# Patient Record
Sex: FEMALE | Race: WHITE | Employment: UNEMPLOYED | URBAN - METROPOLITAN AREA
[De-identification: names, ages, dates, MRNs, and addresses within clinical notes are randomized per-mention and may not be internally consistent; named-entity substitution may affect disease eponyms.]

---

## 2017-09-01 ENCOUNTER — HOSPITAL ENCOUNTER (EMERGENCY)
Age: 22
Discharge: HOME | End: 2017-09-01
Attending: EMERGENCY MEDICINE | Admitting: EMERGENCY MEDICINE
Payer: COMMERCIAL

## 2017-09-01 VITALS
RESPIRATION RATE: 20 BRPM | DIASTOLIC BLOOD PRESSURE: 91 MMHG | WEIGHT: 293 LBS | SYSTOLIC BLOOD PRESSURE: 140 MMHG | HEIGHT: 64 IN | TEMPERATURE: 98.3 F | BODY MASS INDEX: 50.02 KG/M2 | OXYGEN SATURATION: 100 % | HEART RATE: 100 BPM

## 2017-09-01 DIAGNOSIS — R03.0 ELEVATED BLOOD PRESSURE READING: ICD-10-CM

## 2017-09-01 DIAGNOSIS — H57.9 EYE PROBLEM: Primary | ICD-10-CM

## 2017-09-01 PROCEDURE — 99283 EMERGENCY DEPT VISIT LOW MDM: CPT

## 2017-09-01 NOTE — ED NOTES
Pt discharged to home ambulatory and in company of mother  Discharge instructions provided via discussion and handout. Teaching to patient. Verbalized understanding. No questions voiced. Discharged with 0 RX.

## 2017-09-01 NOTE — ED PROVIDER NOTES
Patient is a 23 y.o. female presenting with visual problem. Visual Problems    Associated symptoms include negative. Pertinent negatives include no discharge, no photophobia, no eye redness and no pain. Anum Wick is a 23 y.o. female morbid obesity currently wearing eye glasses presents with sensation of \"darker image while I was taking bath earlier today which lasted few minutes\" but denies of any foreign body sensation, eye discharge, no eye pain, eye lid swelling, no trauma to eye, no flashing lights, no visual loss, no headache, blurred vision, no hx of increased intra occular pressure or eye discharge or redness, no use of contact lenses use. No hx of diabetes. Social History     Social History    Marital status: SINGLE     Spouse name: N/A    Number of children: N/A    Years of education: N/A     Occupational History    Not on file. Social History Main Topics    Smoking status: Never Smoker    Smokeless tobacco: Never Used    Alcohol use No    Drug use: Not on file    Sexual activity: Not on file     Other Topics Concern    Not on file     Social History Narrative    No narrative on file         ALLERGIES: Review of patient's allergies indicates no known allergies. Review of Systems   Constitutional: Negative. HENT: Negative. Eyes: Negative for photophobia, pain, discharge, redness, itching and visual disturbance. Respiratory: Negative for cough and chest tightness. Cardiovascular: Negative. Gastrointestinal: Negative. Endocrine: Negative. Genitourinary: Negative. Musculoskeletal: Negative. Allergic/Immunologic: Negative. Neurological: Negative. Hematological: Negative. Vitals:    09/01/17 1725   BP: (!) 140/91   Pulse: 100   Resp: 20   Temp: 98.3 °F (36.8 °C)   SpO2: 100%   Weight: 158.8 kg (350 lb)   Height: 5' 4\" (1.626 m)            Physical Exam   Constitutional: She is oriented to person, place, and time.  She appears well-developed and well-nourished. No distress. HENT:   Head: Normocephalic and atraumatic. Eyes: Conjunctivae and EOM are normal. Pupils are equal, round, and reactive to light. Right eye exhibits no discharge. Left eye exhibits no discharge. No scleral icterus. Peripheral vision intact both eye. Neck: Normal range of motion. Cardiovascular: Normal rate, regular rhythm and normal heart sounds. Pulmonary/Chest: Effort normal and breath sounds normal. No respiratory distress. She has no wheezes. She has no rales. She exhibits no tenderness. Abdominal: Soft. Bowel sounds are normal. She exhibits no distension. There is no tenderness. There is no rebound and no guarding. Musculoskeletal: Normal range of motion. Neurological: She is alert and oriented to person, place, and time. Skin: Skin is warm. She is not diaphoretic. Psychiatric: She has a normal mood and affect. Her behavior is normal. Judgment and thought content normal.        MDM  Number of Diagnoses or Management Options  Elevated blood pressure reading:   Eye problem:   Diagnosis management comments: Visual acuity   R eye  20/25  L eye  20/25  Both eye  20/20   With no c/o flashing light, no loss of vision, no foreign body sensation, hazy vision, no hx of eye surgery, no pain in eye or with movement, no redness or discharge noted on examination. Will discharge and recommend follow up with Eye doctor as OP. This instruction was given to patient because his blood pressure was elevated on today's encounter and advised to check blood pressure more frequently and take medication if prescribed. Also recommended to follow up with PCP as soon as possible for futher management of blood pressure. Also provided with blood pressure handout during discharge. ED Course       Procedures        DISCHARGE NOTE:  7:12 PM    Kaitlin Haney's  results have been reviewed with her. She has been counseled regarding her diagnosis, treatment, and plan.   She verbally conveys understanding and agreement of the signs, symptoms, diagnosis, treatment and prognosis and additionally agrees to follow up as discussed. She also agrees with the care-plan and conveys that all of her questions have been answered. I have also provided discharge instructions for her that include: educational information regarding their diagnosis and treatment, and list of reasons why they would want to return to the ED prior to their follow-up appointment, should her condition change. CLINICAL IMPRESSION:    1. Eye problem    2. Elevated blood pressure reading        AFTER VISIT PLAN:    There are no discharge medications for this patient. Follow-up Information     Follow up With Details Comments Contact Info    [facility] provider    please follow up with your primary care provider as part of ER follow up. Joe Osborne III, OD Schedule an appointment as soon as possible for a visit  [address] [phone]      [facility] EMERGENCY DEPT  If symptoms worsen [address]  [phone]           Written by Zahira Xiong PA-C       Provider Attestation:    I was personally available for consultation in the emergency department. I have reviewed the chart prior to the patient being discharged and agree with the [facility], including the assessment, treatment plan, and disposition.      Roland Brand MD

## 2017-10-27 ENCOUNTER — HOSPITAL ENCOUNTER (OUTPATIENT)
Dept: LAB | Age: 22
Discharge: HOME | End: 2017-10-27
Payer: COMMERCIAL

## 2017-10-27 PROCEDURE — 88175 CYTOPATH C/V AUTO FLUID REDO: CPT | Performed by: OBSTETRICS & GYNECOLOGY

## 2018-03-08 ENCOUNTER — HOSPITAL ENCOUNTER (OUTPATIENT)
Dept: SLEEP MEDICINE | Age: 23
Discharge: HOME | End: 2018-03-08
Payer: COMMERCIAL

## 2018-03-08 DIAGNOSIS — G47.00 INSOMNIA: ICD-10-CM

## 2018-03-08 DIAGNOSIS — E66.9 OBESITY: ICD-10-CM

## 2018-03-08 DIAGNOSIS — R06.83 SNORING: ICD-10-CM

## 2018-03-08 DIAGNOSIS — G47.33 OSA (OBSTRUCTIVE SLEEP APNEA): ICD-10-CM

## 2018-03-08 PROCEDURE — 95810 POLYSOM 6/> YRS 4/> PARAM: CPT

## 2018-03-09 VITALS — SYSTOLIC BLOOD PRESSURE: 134 MMHG | HEART RATE: 101 BPM | DIASTOLIC BLOOD PRESSURE: 95 MMHG

## 2019-06-28 ENCOUNTER — TELEPHONE (OUTPATIENT)
Dept: PHARMACY | Age: 24
End: 2019-06-28

## 2019-06-28 NOTE — LETTER
Gypsy 2 [facility] Madelyn Christy 10 Phone: toll free [phone] option 7 Ortega Hannah [address] [place] [address]  
 
 
 
 
06/28/19 Dear Ortega Hannah, We tried to reach you recently regarding your Dextroamphetamine and Amphetamine ER prescription, but were unable to reach you on the telephone. We were calling as a courtesy follow up to a letter you may have received in the mail notifying you that your Dextroamphetamine and Amphetamine ER was changing formulary status, resulting in copayment changes. Our records indicated you had recently filled a prescription for Dextroamphetamine and Amphetamine ER 15 mg tablets. Effective 7/1/2019, this medication will be changing to a non-preferred (i.e. higher) copayment. Please contact us with any questions and ask for [facility]. Sincerely, Gypsy 2 Phone: toll free [phone], option 7

## 2019-06-28 NOTE — TELEPHONE ENCOUNTER
CLINICAL PHARMACY CONSULT: MEDICATION OUTREACH    Eddye Evangelista is a 30 y.o. female referred to clinical pharmacy specialist - identified with current prescription for Dextroamphetamine and Amphetamine ER 15 MG. Starting 7/1/19, the prescribed medication is going to be moving from tier 1 to tier 2 as the patient will be at least 30years old. · Patient's current copay is $12 for 30ds. · After 7/1/19, patient's copay will increase to $30 for a 30 day supply. Plan:  Contact patient to review copay increase prior to effective date as a courtesy call. - Labs/follow up:  Per provider discretion     Marco Antonio Escalante PharmD  Clinical Pharmacist  [phone] (Option 7)  ======================================================  LM for patient to return call to review above. Will send letter at this time as patient does not have Mychart. Left Fartun Meredith as the callback name instead of \"Jennifer\" code for conversion calls.

## 2019-07-02 NOTE — TELEPHONE ENCOUNTER
Made second attempt to reach patient. Letter has already been sent.  Will sign off at this time    Otoniel Gonzalez PharmD  Clinical Pharmacist  [phone] (Option 7)

## 2019-10-10 LAB
HBA1C MFR BLD HPLC: 11.3 %
LDL-C, EXTERNAL: 195

## 2020-02-17 ENCOUNTER — TELEPHONE (OUTPATIENT)
Dept: PHARMACY | Age: 25
End: 2020-02-17

## 2020-02-17 NOTE — TELEPHONE ENCOUNTER
Pharmacy Pop Care Documentation:   Patient is missing the following requirements: DM Program Application; [employer] account creation. If completed by 3/01/20 patient will be eligible for enrollment in the DM Program on 4/01/20. Application e-mailed to patient's parent: Bib Lemus.     Mary Conklin, [address] [facility]   Department, toll free: [phone], option 7

## 2020-02-17 NOTE — TELEPHONE ENCOUNTER
Incoming call from employee/Mother, Kim Reese regarding enrollment for DM program and [facility]. Patient is relatively newly diagnosed and not with Peter Tony. Patient is an adult dependent. Emailed enrollment forms for both using email she provided:  [email or web address]    Routed to  for tracking purposes.

## 2020-02-26 ENCOUNTER — OFFICE VISIT (OUTPATIENT)
Dept: FAMILY MEDICINE CLINIC | Facility: CLINIC | Age: 25
End: 2020-02-26

## 2020-02-26 VITALS
HEART RATE: 93 BPM | DIASTOLIC BLOOD PRESSURE: 80 MMHG | SYSTOLIC BLOOD PRESSURE: 110 MMHG | TEMPERATURE: 98.5 F | BODY MASS INDEX: 50.02 KG/M2 | RESPIRATION RATE: 17 BRPM | HEIGHT: 64 IN | WEIGHT: 293 LBS | OXYGEN SATURATION: 96 %

## 2020-02-26 DIAGNOSIS — Z23 ENCOUNTER FOR IMMUNIZATION: ICD-10-CM

## 2020-02-26 DIAGNOSIS — Z79.4 TYPE 2 DIABETES MELLITUS WITH HYPERGLYCEMIA, WITH LONG-TERM CURRENT USE OF INSULIN (HCC): Primary | ICD-10-CM

## 2020-02-26 DIAGNOSIS — R00.2 PALPITATIONS: ICD-10-CM

## 2020-02-26 DIAGNOSIS — E11.65 TYPE 2 DIABETES MELLITUS WITH HYPERGLYCEMIA, WITH LONG-TERM CURRENT USE OF INSULIN (HCC): Primary | ICD-10-CM

## 2020-02-26 DIAGNOSIS — E66.01 MORBID OBESITY DUE TO EXCESS CALORIES (HCC): ICD-10-CM

## 2020-02-26 DIAGNOSIS — E78.5 DYSLIPIDEMIA: ICD-10-CM

## 2020-02-26 PROBLEM — F32.A DEPRESSION: Status: ACTIVE | Noted: 2020-02-26

## 2020-02-26 PROBLEM — N92.6 IRREGULAR PERIODS: Status: ACTIVE | Noted: 2020-02-26

## 2020-02-26 PROBLEM — N92.0 MENORRHAGIA: Status: ACTIVE | Noted: 2020-02-26

## 2020-02-26 RX ORDER — INSULIN GLARGINE 100 [IU]/ML
32 INJECTION, SOLUTION SUBCUTANEOUS DAILY
COMMUNITY
Start: 2019-12-31 | End: 2020-02-26 | Stop reason: SDUPTHER

## 2020-02-26 RX ORDER — TRAZODONE HYDROCHLORIDE 50 MG/1
100 TABLET ORAL
COMMUNITY
Start: 2020-01-31 | End: 2021-01-28 | Stop reason: DRUGHIGH

## 2020-02-26 RX ORDER — PEN NEEDLE, DIABETIC 30 GX3/16"
1 NEEDLE, DISPOSABLE MISCELLANEOUS
COMMUNITY
Start: 2019-11-14 | End: 2020-06-02 | Stop reason: SDUPTHER

## 2020-02-26 RX ORDER — ZINC GLUCONATE 10 MG
1 LOZENGE ORAL
COMMUNITY
Start: 2019-01-31

## 2020-02-26 RX ORDER — INSULIN GLARGINE 100 [IU]/ML
32 INJECTION, SOLUTION SUBCUTANEOUS DAILY
Qty: 28.8 ML | Refills: 0 | Status: SHIPPED | OUTPATIENT
Start: 2020-02-26 | End: 2020-05-26 | Stop reason: SDUPTHER

## 2020-02-26 RX ORDER — FLUOXETINE HYDROCHLORIDE 60 MG/1
TABLET, FILM COATED ORAL; ORAL
COMMUNITY
Start: 2020-01-31

## 2020-02-26 RX ORDER — BUSPIRONE HYDROCHLORIDE 10 MG/1
1 TABLET ORAL
COMMUNITY
Start: 2019-05-01 | End: 2021-01-28 | Stop reason: DRUGHIGH

## 2020-02-26 RX ORDER — LEVOTHYROXINE SODIUM 50 UG/1
TABLET ORAL
COMMUNITY
Start: 2019-09-30 | End: 2020-04-28 | Stop reason: SDUPTHER

## 2020-02-26 RX ORDER — ACETAMINOPHEN, DIPHENHYDRAMINE HCL, PHENYLEPHRINE HCL 325; 25; 5 MG/1; MG/1; MG/1
200 TABLET ORAL
COMMUNITY

## 2020-02-26 RX ORDER — DEXTROAMPHETAMINE SACCHARATE, AMPHETAMINE ASPARTATE MONOHYDRATE, DEXTROAMPHETAMINE SULFATE AND AMPHETAMINE SULFATE 3.75; 3.75; 3.75; 3.75 MG/1; MG/1; MG/1; MG/1
CAPSULE, EXTENDED RELEASE ORAL
COMMUNITY
Start: 2020-01-29

## 2020-02-26 RX ORDER — ATORVASTATIN CALCIUM 20 MG/1
TABLET, FILM COATED ORAL
COMMUNITY
Start: 2020-02-01 | End: 2020-12-15 | Stop reason: SDUPTHER

## 2020-02-26 NOTE — PROGRESS NOTES
History and Physical    Today's Date:  3/3/2020   Patient's Name: Cm Cantu   Patient's :  1995     History:     Chief Complaint   Patient presents with    Palpitations     2-3 days ago    [address] Diabetes    Weight Management    Depression     Kaitlin Lory Duane is a 30 y.o. female presenting for initial visit to establish care. Will obtain records from previous provider to review. Care team updated on connect care. Hypothyroidism   This is a chronic problem, new to me. This is at goal. Pt takes synthroid. Pt takes this medication correctly. Obesity Class I/Hyperlipidemia  This is a chronic problem, new to me. This is not at goal. Pt does exercises regularly. Pt tries to eat a healthy diet. Anxiety/Depression/Insomnia/Adult ADD  This is a chronic problem, new to me. This is controlled. Pt is on buspar, adderall and trazodone. Pt does not have a psychiatrist. Pt thinks about harming herself but does not have access to guns or sharp objects. Diabetes mellitus  This is a chronic problem, new to me. BS is not at goal. Pt is on lantus. Pt is compliant with this medication.      3 most recent PHQ Screens 2020   Little interest or pleasure in doing things Not at all   Feeling down, depressed, irritable, or hopeless More than half the days   Total Score PHQ 2 2   Trouble falling or staying asleep, or sleeping too much Nearly every day   Feeling tired or having little energy More than half the days   Poor appetite, weight loss, or overeating Nearly every day   Feeling bad about yourself - or that you are a failure or have let yourself or your family down Several days   Trouble concentrating on things such as school, work, reading, or watching TV More than half the days   Moving or speaking so slowly that other people could have noticed; or the opposite being so fidgety that others notice Not at all   Thoughts of being better off dead, or hurting yourself in some way Several days   PHQ 9 Score 14      Past Medical History:   Diagnosis Date    ADHD     Anxiety     Depression     Diabetes ([facility] [address] [address]     Hypercholesterolemia     Insomnia     Thyroid disease      History reviewed. No pertinent surgical history. reports that she has never smoked. She has never used smokeless tobacco. She reports that she does not drink alcohol or use drugs. Family History   Problem Relation Age of Onset    Diabetes Mother     Diabetes Father      No Known Allergies    Problem List:      Patient Active Problem List   Diagnosis Code    Depression F32.9    Dyslipidemia E78.5    Irregular periods N92.6    Morbid obesity due to excess calories ([facility] E66.01    Type 2 diabetes mellitus with hyperglycemia, with long-term current use of insulin ([facility]) E11.65, Z79.4    Menorrhagia N92.0     Medications:     Current Outpatient Medications   Medication Sig    atorvastatin (LIPITOR) 20 mg tablet TAKE 1 TABLET BY MOUTH EVERYDAY AT BEDTIME    busPIRone (BUSPAR) 10 mg tablet Take 1 Tab by mouth.  amphetamine-dextroamphetamine XR (ADDERALL XR) 15 mg XR capsule     FLUoxetine 60 mg tab     levothyroxine (SYNTHROID) 50 mcg tablet levothyroxine 50 mcg tablet   TAKE 1 TAB BY MOUTH ONCE A DAY.  melatonin 10 mg tab 200 mg.  traZODone (DESYREL) 50 mg tablet     Insulin Needles, Disposable, 31 gauge x 5/16\" ndle 1 Each by SubCUTAneous route.  D3-folic acid-collagen,bovine, 800 unit-1 mg- 300 mg cap Take 1 Cap by mouth.  magnesium 250 mg tab Take 1 Tab by mouth.  LANTUS SOLOSTAR U-100 INSULIN 100 unit/mL (3 mL) inpn 32 Units by SubCUTAneous route daily for 90 days. No current facility-administered medications for this visit.       Review of Systems:   General:   fevers, chills  Neurologic: dizziness, lightheadedness  Eyes:  vision changes, double vision, photophobia  Ears:  change in hearing, ear pain, ear discharge, ear ringing  Nose:  sneezing, runny nose  Mouth/Throat: sore throat, voice change  Neck:  pain, stiffness  Respiratory: dyspnea at rest, dyspnea on exertion, wheezing, cough, sputum production  Cardiovascular:   +chest pain, +palpitations (2-3 months, 3-4 hours of walking)  Breasts: lumps, discharge, pain, rash  Gastrointestinal:  nausea, vomiting  Urinary: dysuria, urinary frequency, nocturia, malodorous urine  Genital (F): vaginal discharge, ulcerations  Musculoskeletal:  joint pain, back pain  Psychiatric: +insomnia, +anxiety  Endocrine: cold intolerance, +heat intolerance  Hematologic: easy bruising, easy bleeding  Dermatologic: Itching, rash    Physical Assessment:     Visit Vitals  /80 (BP 1 Location: Left arm, BP Patient Position: Sitting)   Pulse 93   Temp 98.5 °F (36.9 °C) (Oral)   Resp 17   Ht 5' 4\" (1.626 m)   Wt 315 lb (142.9 kg)   LMP 02/26/2020   SpO2 96%   BMI 54.07 kg/m²     General:   Well-groomed, well-nourished, in no distress, pleasant, appropriate and conversant. Eyes:    PERRL, conjunctiva clear  Ears:  TMs normal, no ear wax  Mouth:  oropharynx WNL without membranes, exudates, petechiae or ulcers  Cardiovascular:   RRR, no MRG. Pulmonary:   Lungs clear bilaterally. Normal respiratory effort. Abdomen:   Abdomen soft, NT, ND  Extremities:   No edema, LEs warm and well-perfused. Neuro:   Alert and oriented, no focal deficits. No facial asymmetry noted. Skin:    No rash or jaundice  MSK:   Normal ROM, 5/5 muscle strength  Psych:  No pressured speech or abnormal thought content    Lab Results   Component Value Date/Time    GFR est AA >60 02/28/2020 11:18 AM    GFR est non-AA >60 02/28/2020 11:18 AM    Creatinine 0.71 02/28/2020 11:18 AM    BUN 17 02/28/2020 11:18 AM    Sodium 139 02/28/2020 11:18 AM    Potassium 4.5 02/28/2020 11:18 AM    Chloride 107 02/28/2020 11:18 AM    CO2 28 02/28/2020 11:18 AM     EKG: normal rate, normal rhythm, normal NM/QT/QRS intervals    Assessment/Plan & Orders:         ICD-10-CM ICD-9-CM    1.  Type 2 diabetes mellitus with hyperglycemia, with long-term current use of insulin (HCC) E11.65 250.00 MICROALBUMIN, UR, RAND W/ MICROALB/CREAT RATIO    Z79.4 790.29 LANTUS SOLOSTAR U-100 INSULIN 100 unit/mL (3 mL) inpn     V58.67 CBC WITH AUTOMATED DIFF      HEMOGLOBIN A1C WITH EAG   2. Morbid obesity due to excess calories (HCC) E66.01 278.01 CBC WITH AUTOMATED DIFF      METABOLIC PANEL, COMPREHENSIVE      LIPID PANEL      HEMOGLOBIN A1C WITH EAG      T4, FREE      TSH 3RD GENERATION   3. Dyslipidemia Y89.9 689.5 METABOLIC PANEL, COMPREHENSIVE      LIPID PANEL   4. Palpitations R00.2 785.1 AMB POC EKG ROUTINE W/ 12 LEADS, INTER & REP   5. Encounter for immunization Z23 V03.89 pneumococcal 23-valent (PNEUMOVAX 23) 25 mcg/0.5 mL injection     HM  Colon cancer: colonoscopy due at age 46  Influenza vaccine: complete  Pneumococcal vaccine: due   Tdap: unknown  Herpes Zoster vaccine: due at age 59  Hep B vaccine: not indicated (liver dz, DM 19-59)  Weight:  Body mass index is 54.07 kg/m². Discussed the patient's BMI with her. The BMI follow up plan is as follows: diet and exercise  Cervical cancer:  pap smear done, has gyn  Breast Cancer: mammogram at age 34  Osteoporosis: No indication for DEXA scan    Follow up with psychiatry  Diet and exercise  Increase lantus by 3 units every 3 days until BS run <200    Follow-up and Dispositions    · Return in about 4 weeks (around 3/25/2020) for Weight management, Diabetes mellitus. *Patient verbalized understanding and agreement with the plan. Patient was given an after-visit summary. Yuniel Fajardo.  Senait Ernandez MD - Internal Medicine  3/3/2020, 12:41 PM  [facility]  [address] [place], [address]  Phone [phone]  Fax [phone]

## 2020-02-26 NOTE — PROGRESS NOTES
Taryn Valdes is a 30 y.o.  female presents today for office visit for establish care. Pt would also like to discuss medication refill. Pt is not fasting. Pt is in Room # 2      1. Have you been to the ER, urgent care clinic since your last visit? Hospitalized since your last visit? No    2. Have you seen or consulted any other health care providers outside of the [address] [place] [address] since your last visit? Include any pap smears or colon screening.  No    Upcoming Appts  none    Health Maintenance reviewed     VORB:   Orders Placed This Encounter    AMB EXT HGBA1C    AMB EXT LDL-C   Brayton Prader, Parks Hacker, LPN

## 2020-02-28 ENCOUNTER — HOSPITAL ENCOUNTER (OUTPATIENT)
Dept: LAB | Age: 25
Discharge: HOME | End: 2020-02-28
Payer: COMMERCIAL

## 2020-02-28 DIAGNOSIS — E66.01 MORBID OBESITY DUE TO EXCESS CALORIES (HCC): ICD-10-CM

## 2020-02-28 DIAGNOSIS — E11.65 TYPE 2 DIABETES MELLITUS WITH HYPERGLYCEMIA, WITH LONG-TERM CURRENT USE OF INSULIN (HCC): ICD-10-CM

## 2020-02-28 DIAGNOSIS — Z79.4 TYPE 2 DIABETES MELLITUS WITH HYPERGLYCEMIA, WITH LONG-TERM CURRENT USE OF INSULIN (HCC): ICD-10-CM

## 2020-02-28 DIAGNOSIS — E78.5 DYSLIPIDEMIA: ICD-10-CM

## 2020-02-28 LAB
ALBUMIN SERPL-MCNC: 3.1 G/DL (ref 3.4–5)
ALBUMIN/GLOB SERPL: 0.9 {RATIO} (ref 0.8–1.7)
ALP SERPL-CCNC: 78 U/L (ref 45–117)
ALT SERPL-CCNC: 16 U/L (ref 13–56)
ANION GAP SERPL CALC-SCNC: 4 MMOL/L (ref 3–18)
AST SERPL-CCNC: 7 U/L (ref 10–38)
BASOPHILS # BLD: 0 K/UL (ref 0–0.1)
BASOPHILS NFR BLD: 0 % (ref 0–2)
BILIRUB SERPL-MCNC: 0.4 MG/DL (ref 0.2–1)
BUN SERPL-MCNC: 17 MG/DL (ref 7–18)
BUN/CREAT SERPL: 24 (ref 12–20)
CALCIUM SERPL-MCNC: 8.8 MG/DL (ref 8.5–10.1)
CHLORIDE SERPL-SCNC: 107 MMOL/L (ref 100–111)
CHOLEST SERPL-MCNC: 185 MG/DL
CO2 SERPL-SCNC: 28 MMOL/L (ref 21–32)
CREAT SERPL-MCNC: 0.71 MG/DL (ref 0.6–1.3)
CREAT UR-MCNC: 127 MG/DL (ref 30–125)
DIFFERENTIAL METHOD BLD: ABNORMAL
EOSINOPHIL # BLD: 0.1 K/UL (ref 0–0.4)
EOSINOPHIL NFR BLD: 1 % (ref 0–5)
ERYTHROCYTE [DISTWIDTH] IN BLOOD BY AUTOMATED COUNT: 13.7 % (ref 11.6–14.5)
EST. AVERAGE GLUCOSE BLD GHB EST-MCNC: 217 MG/DL
GLOBULIN SER CALC-MCNC: 3.3 G/DL (ref 2–4)
GLUCOSE SERPL-MCNC: 202 MG/DL (ref 74–99)
HBA1C MFR BLD: 9.2 % (ref 4.2–5.6)
HCT VFR BLD AUTO: 39.3 % (ref 35–45)
HDLC SERPL-MCNC: 34 MG/DL (ref 40–60)
HDLC SERPL: 5.4 {RATIO} (ref 0–5)
HGB BLD-MCNC: 12.4 G/DL (ref 12–16)
LDLC SERPL CALC-MCNC: 129.6 MG/DL (ref 0–100)
LIPID PROFILE,FLP: ABNORMAL
LYMPHOCYTES # BLD: 1.4 K/UL (ref 0.9–3.6)
LYMPHOCYTES NFR BLD: 17 % (ref 21–52)
MCH RBC QN AUTO: 26.6 PG (ref 24–34)
MCHC RBC AUTO-ENTMCNC: 31.6 G/DL (ref 31–37)
MCV RBC AUTO: 84.2 FL (ref 74–97)
MICROALBUMIN UR-MCNC: 6.63 MG/DL (ref 0–3)
MICROALBUMIN/CREAT UR-RTO: 52 MG/G (ref 0–30)
MONOCYTES # BLD: 0.5 K/UL (ref 0.05–1.2)
MONOCYTES NFR BLD: 7 % (ref 3–10)
NEUTS SEG # BLD: 6 K/UL (ref 1.8–8)
NEUTS SEG NFR BLD: 75 % (ref 40–73)
PLATELET # BLD AUTO: 243 K/UL (ref 135–420)
PMV BLD AUTO: 11.9 FL (ref 9.2–11.8)
POTASSIUM SERPL-SCNC: 4.5 MMOL/L (ref 3.5–5.5)
PROT SERPL-MCNC: 6.4 G/DL (ref 6.4–8.2)
RBC # BLD AUTO: 4.67 M/UL (ref 4.2–5.3)
SODIUM SERPL-SCNC: 139 MMOL/L (ref 136–145)
T4 FREE SERPL-MCNC: 1.2 NG/DL (ref 0.7–1.5)
TRIGL SERPL-MCNC: 107 MG/DL (ref ?–150)
TSH SERPL DL<=0.05 MIU/L-ACNC: 1.8 UIU/ML (ref 0.36–3.74)
VLDLC SERPL CALC-MCNC: 21.4 MG/DL
WBC # BLD AUTO: 8 K/UL (ref 4.6–13.2)

## 2020-02-28 PROCEDURE — 85025 COMPLETE CBC W/AUTO DIFF WBC: CPT

## 2020-02-28 PROCEDURE — 80061 LIPID PANEL: CPT

## 2020-02-28 PROCEDURE — 36415 COLL VENOUS BLD VENIPUNCTURE: CPT

## 2020-02-28 PROCEDURE — 83036 HEMOGLOBIN GLYCOSYLATED A1C: CPT

## 2020-02-28 PROCEDURE — 80053 COMPREHEN METABOLIC PANEL: CPT

## 2020-02-28 PROCEDURE — 82043 UR ALBUMIN QUANTITATIVE: CPT

## 2020-02-28 PROCEDURE — 84439 ASSAY OF FREE THYROXINE: CPT

## 2020-02-28 PROCEDURE — 84443 ASSAY THYROID STIM HORMONE: CPT

## 2020-03-09 ENCOUNTER — TELEPHONE (OUTPATIENT)
Dept: PHARMACY | Age: 25
End: 2020-03-09

## 2020-03-09 ENCOUNTER — PATIENT MESSAGE (OUTPATIENT)
Dept: FAMILY MEDICINE CLINIC | Facility: CLINIC | Age: 25
End: 2020-03-09

## 2020-03-09 RX ORDER — FLUOXETINE HYDROCHLORIDE 60 MG/1
TABLET, FILM COATED ORAL; ORAL
Qty: 90 TAB | Refills: 3 | OUTPATIENT
Start: 2020-03-09

## 2020-03-09 NOTE — PROGRESS NOTES
Result reviewed. LPN to call pt with results. A1c and cholesterol are not at goal. Pt to increase insulin by 3 units every 3 days until blood sugars run below 200. Needs a follow up appt. Pt should be taking a BS log.  Statin is on board but pt needs to know that this medicine is teratogenic and can be harmful to a baby if she becomes pregnant

## 2020-03-09 NOTE — TELEPHONE ENCOUNTER
Patient would like to enroll in the 2020 DM Program. Patient is aware that the deadline is 03/15/20 and will fax in the application that was emailed to them today. Patient is also aware of the [employer] christen but prefers paper application.

## 2020-03-09 NOTE — LETTER
Gypsy 2 [address] [facility] Madelyn Christy [address] 10 Phone: toll free [phone] option 7 Ms. Sheree Cooper [facility][note 2][address] [address] Thanks so much for taking the first step towards better health. This letter is to inform you that we have received your [facility] Form but you are still missing the following requirements or documentation: 
  
Diabetes Management Program Application The deadline to enroll into this program on 4/01/20 has past - the deadline was 3/27/20. Our next enrollment periods are as follows: July 1st - deadline to submit Application and Requirements by June 1st October 1st - deadline to submit Application and Requirements by September 1st 
  
To qualify for this program the above requirement must be met by the dates listed above. Results or visits obtained outside of [facility] will need to be provided by fax: [phone] or email: [email or web address] before the deadline in order to qualify for the program. 
  
If requirements are not met by the date listed above you will be not be enrolled in the program.  
  
  
Gypsy 2 Phone: toll free [phone], option 7 Email: [email or web address] Fax Number: [phone]

## 2020-03-10 NOTE — TELEPHONE ENCOUNTER
Noted - see encounter from 9/38/47: DM Program Application was also e-mailed to patient/parent. Pharmacy Pop Care Documentation:   Patient is missing the following requirements: DM Program Application.   If completed by 3/01/20 patient will be eligible for enrollment in the DM Program on 2/14/88.     Application e-mailed to patient's parent: Michael Motley, [address] [employer] [facility]   Department, toll free: [phone], option 7

## 2020-03-19 ENCOUNTER — PATIENT MESSAGE (OUTPATIENT)
Dept: FAMILY MEDICINE CLINIC | Facility: CLINIC | Age: 25
End: 2020-03-19

## 2020-03-19 DIAGNOSIS — E11.65 TYPE 2 DIABETES MELLITUS WITH HYPERGLYCEMIA, WITH LONG-TERM CURRENT USE OF INSULIN (HCC): ICD-10-CM

## 2020-03-19 DIAGNOSIS — Z79.4 TYPE 2 DIABETES MELLITUS WITH HYPERGLYCEMIA, WITH LONG-TERM CURRENT USE OF INSULIN (HCC): ICD-10-CM

## 2020-03-20 RX ORDER — INSULIN GLARGINE 100 [IU]/ML
32 INJECTION, SOLUTION SUBCUTANEOUS DAILY
Refills: 0 | OUTPATIENT
Start: 2020-03-20 | End: 2020-06-18

## 2020-03-20 NOTE — TELEPHONE ENCOUNTER
LPN to find out how many units of insulin she is taking as this was just prescribed less than a month ago

## 2020-03-30 NOTE — TELEPHONE ENCOUNTER
Received Health Provider Diabetes Screening Form. Patient still missing DM Program Application. Letter mailed to patient advising the above information and our next enrollment dates: 7/01/20 (deadline to submit requirements: 6/01/20) and 10/01/20 (deadline to submit requirements: 9/01/20).

## 2020-04-28 RX ORDER — LEVOTHYROXINE SODIUM 50 UG/1
50 TABLET ORAL DAILY
Qty: 90 TAB | Refills: 0 | Status: SHIPPED | OUTPATIENT
Start: 2020-04-28 | End: 2020-08-03 | Stop reason: SDUPTHER

## 2020-06-02 ENCOUNTER — VIRTUAL VISIT (OUTPATIENT)
Dept: FAMILY MEDICINE CLINIC | Facility: CLINIC | Age: 25
End: 2020-06-02

## 2020-06-02 DIAGNOSIS — Z79.4 TYPE 2 DIABETES MELLITUS WITH HYPERGLYCEMIA, WITH LONG-TERM CURRENT USE OF INSULIN (HCC): Primary | ICD-10-CM

## 2020-06-02 DIAGNOSIS — E78.5 DYSLIPIDEMIA: ICD-10-CM

## 2020-06-02 DIAGNOSIS — E03.9 ACQUIRED HYPOTHYROIDISM: ICD-10-CM

## 2020-06-02 DIAGNOSIS — E66.01 MORBID OBESITY DUE TO EXCESS CALORIES (HCC): ICD-10-CM

## 2020-06-02 DIAGNOSIS — E11.65 TYPE 2 DIABETES MELLITUS WITH HYPERGLYCEMIA, WITH LONG-TERM CURRENT USE OF INSULIN (HCC): Primary | ICD-10-CM

## 2020-06-02 RX ORDER — INSULIN PUMP SYRINGE, 3 ML
EACH MISCELLANEOUS
Qty: 1 KIT | Refills: 0 | Status: SHIPPED | OUTPATIENT
Start: 2020-06-02 | End: 2021-01-28 | Stop reason: ALTCHOICE

## 2020-06-02 RX ORDER — LANCETS
EACH MISCELLANEOUS
Qty: 200 EACH | Refills: 1 | Status: SHIPPED | OUTPATIENT
Start: 2020-06-02 | End: 2021-01-28 | Stop reason: ALTCHOICE

## 2020-06-02 RX ORDER — PEN NEEDLE, DIABETIC 30 GX3/16"
1 NEEDLE, DISPOSABLE MISCELLANEOUS DAILY
Qty: 90 PEN NEEDLE | Refills: 1 | Status: SHIPPED | OUTPATIENT
Start: 2020-06-02

## 2020-06-04 ENCOUNTER — HOSPITAL ENCOUNTER (OUTPATIENT)
Dept: LAB | Age: 25
Discharge: HOME | End: 2020-06-04
Payer: COMMERCIAL

## 2020-06-04 DIAGNOSIS — E11.65 TYPE 2 DIABETES MELLITUS WITH HYPERGLYCEMIA, WITH LONG-TERM CURRENT USE OF INSULIN (HCC): ICD-10-CM

## 2020-06-04 DIAGNOSIS — Z79.4 TYPE 2 DIABETES MELLITUS WITH HYPERGLYCEMIA, WITH LONG-TERM CURRENT USE OF INSULIN (HCC): ICD-10-CM

## 2020-06-04 DIAGNOSIS — E03.9 ACQUIRED HYPOTHYROIDISM: ICD-10-CM

## 2020-06-04 DIAGNOSIS — E78.5 DYSLIPIDEMIA: ICD-10-CM

## 2020-06-04 DIAGNOSIS — E66.01 MORBID OBESITY DUE TO EXCESS CALORIES (HCC): ICD-10-CM

## 2020-06-04 LAB
ALBUMIN SERPL-MCNC: 3.2 G/DL (ref 3.4–5)
ALBUMIN/GLOB SERPL: 1 {RATIO} (ref 0.8–1.7)
ALP SERPL-CCNC: 78 U/L (ref 45–117)
ALT SERPL-CCNC: 18 U/L (ref 13–56)
ANION GAP SERPL CALC-SCNC: 7 MMOL/L (ref 3–18)
AST SERPL-CCNC: 11 U/L (ref 10–38)
BASOPHILS # BLD: 0 K/UL (ref 0–0.1)
BASOPHILS NFR BLD: 0 % (ref 0–2)
BILIRUB SERPL-MCNC: 0.7 MG/DL (ref 0.2–1)
BUN SERPL-MCNC: 13 MG/DL (ref 7–18)
BUN/CREAT SERPL: 17 (ref 12–20)
CALCIUM SERPL-MCNC: 8.9 MG/DL (ref 8.5–10.1)
CHLORIDE SERPL-SCNC: 103 MMOL/L (ref 100–111)
CHOLEST SERPL-MCNC: 165 MG/DL
CO2 SERPL-SCNC: 29 MMOL/L (ref 21–32)
CREAT SERPL-MCNC: 0.76 MG/DL (ref 0.6–1.3)
DIFFERENTIAL METHOD BLD: ABNORMAL
EOSINOPHIL # BLD: 0.2 K/UL (ref 0–0.4)
EOSINOPHIL NFR BLD: 3 % (ref 0–5)
ERYTHROCYTE [DISTWIDTH] IN BLOOD BY AUTOMATED COUNT: 15.2 % (ref 11.6–14.5)
EST. AVERAGE GLUCOSE BLD GHB EST-MCNC: 243 MG/DL
GLOBULIN SER CALC-MCNC: 3.3 G/DL (ref 2–4)
GLUCOSE SERPL-MCNC: 167 MG/DL (ref 74–99)
HBA1C MFR BLD: 10.1 % (ref 4.2–5.6)
HCT VFR BLD AUTO: 40.6 % (ref 35–45)
HDLC SERPL-MCNC: 33 MG/DL (ref 40–60)
HDLC SERPL: 5 {RATIO} (ref 0–5)
HGB BLD-MCNC: 12.3 G/DL (ref 12–16)
LDLC SERPL CALC-MCNC: 111.2 MG/DL (ref 0–100)
LIPID PROFILE,FLP: ABNORMAL
LYMPHOCYTES # BLD: 1.6 K/UL (ref 0.9–3.6)
LYMPHOCYTES NFR BLD: 23 % (ref 21–52)
MCH RBC QN AUTO: 25.1 PG (ref 24–34)
MCHC RBC AUTO-ENTMCNC: 30.3 G/DL (ref 31–37)
MCV RBC AUTO: 82.9 FL (ref 74–97)
MONOCYTES # BLD: 0.8 K/UL (ref 0.05–1.2)
MONOCYTES NFR BLD: 11 % (ref 3–10)
NEUTS SEG # BLD: 4.4 K/UL (ref 1.8–8)
NEUTS SEG NFR BLD: 63 % (ref 40–73)
PLATELET # BLD AUTO: 233 K/UL (ref 135–420)
PMV BLD AUTO: 12.1 FL (ref 9.2–11.8)
POTASSIUM SERPL-SCNC: 5 MMOL/L (ref 3.5–5.5)
PROT SERPL-MCNC: 6.5 G/DL (ref 6.4–8.2)
RBC # BLD AUTO: 4.9 M/UL (ref 4.2–5.3)
SODIUM SERPL-SCNC: 139 MMOL/L (ref 136–145)
T4 FREE SERPL-MCNC: 1.4 NG/DL (ref 0.7–1.5)
TRIGL SERPL-MCNC: 104 MG/DL (ref ?–150)
TSH SERPL DL<=0.05 MIU/L-ACNC: 2.95 UIU/ML (ref 0.36–3.74)
VLDLC SERPL CALC-MCNC: 20.8 MG/DL
WBC # BLD AUTO: 7 K/UL (ref 4.6–13.2)

## 2020-06-04 PROCEDURE — 84681 ASSAY OF C-PEPTIDE: CPT

## 2020-06-04 PROCEDURE — 85025 COMPLETE CBC W/AUTO DIFF WBC: CPT

## 2020-06-04 PROCEDURE — 84439 ASSAY OF FREE THYROXINE: CPT

## 2020-06-04 PROCEDURE — 80053 COMPREHEN METABOLIC PANEL: CPT

## 2020-06-04 PROCEDURE — 80061 LIPID PANEL: CPT

## 2020-06-04 PROCEDURE — 84443 ASSAY THYROID STIM HORMONE: CPT

## 2020-06-04 PROCEDURE — 83036 HEMOGLOBIN GLYCOSYLATED A1C: CPT

## 2020-06-04 PROCEDURE — 86341 ISLET CELL ANTIBODY: CPT

## 2020-06-05 ENCOUNTER — APPOINTMENT (OUTPATIENT)
Dept: GENERAL RADIOLOGY | Age: 25
End: 2020-06-05
Attending: EMERGENCY MEDICINE
Payer: COMMERCIAL

## 2020-06-05 ENCOUNTER — HOSPITAL ENCOUNTER (EMERGENCY)
Age: 25
Discharge: HOME | End: 2020-06-05
Attending: EMERGENCY MEDICINE
Payer: COMMERCIAL

## 2020-06-05 VITALS
OXYGEN SATURATION: 96 % | SYSTOLIC BLOOD PRESSURE: 161 MMHG | WEIGHT: 293 LBS | HEART RATE: 102 BPM | RESPIRATION RATE: 17 BRPM | DIASTOLIC BLOOD PRESSURE: 99 MMHG | TEMPERATURE: 100.7 F | HEIGHT: 63 IN | BODY MASS INDEX: 51.91 KG/M2

## 2020-06-05 DIAGNOSIS — T78.40XA ALLERGIC REACTION, INITIAL ENCOUNTER: Primary | ICD-10-CM

## 2020-06-05 LAB
ALBUMIN SERPL-MCNC: 3 G/DL (ref 3.4–5)
ALBUMIN/GLOB SERPL: 0.8 {RATIO} (ref 0.8–1.7)
ALP SERPL-CCNC: 79 U/L (ref 45–117)
ALT SERPL-CCNC: 19 U/L (ref 13–56)
ANION GAP SERPL CALC-SCNC: 4 MMOL/L (ref 3–18)
AST SERPL-CCNC: 10 U/L (ref 10–38)
BASOPHILS # BLD: 0 K/UL (ref 0–0.1)
BASOPHILS NFR BLD: 0 % (ref 0–2)
BILIRUB SERPL-MCNC: 0.6 MG/DL (ref 0.2–1)
BUN SERPL-MCNC: 14 MG/DL (ref 7–18)
BUN/CREAT SERPL: 19 (ref 12–20)
CALCIUM SERPL-MCNC: 8.4 MG/DL (ref 8.5–10.1)
CHLORIDE SERPL-SCNC: 105 MMOL/L (ref 100–111)
CO2 SERPL-SCNC: 26 MMOL/L (ref 21–32)
CREAT SERPL-MCNC: 0.73 MG/DL (ref 0.6–1.3)
DIFFERENTIAL METHOD BLD: ABNORMAL
EOSINOPHIL # BLD: 0.2 K/UL (ref 0–0.4)
EOSINOPHIL NFR BLD: 3 % (ref 0–5)
ERYTHROCYTE [DISTWIDTH] IN BLOOD BY AUTOMATED COUNT: 14.4 % (ref 11.6–14.5)
GLOBULIN SER CALC-MCNC: 3.7 G/DL (ref 2–4)
GLUCOSE SERPL-MCNC: 204 MG/DL (ref 74–99)
HCT VFR BLD AUTO: 40.4 % (ref 35–45)
HGB BLD-MCNC: 12.9 G/DL (ref 12–16)
LACTATE SERPL-SCNC: 1 MMOL/L (ref 0.4–2)
LYMPHOCYTES # BLD: 0.7 K/UL (ref 0.9–3.6)
LYMPHOCYTES NFR BLD: 11 % (ref 21–52)
MCH RBC QN AUTO: 25.6 PG (ref 24–34)
MCHC RBC AUTO-ENTMCNC: 31.9 G/DL (ref 31–37)
MCV RBC AUTO: 80.2 FL (ref 74–97)
MONOCYTES # BLD: 0.3 K/UL (ref 0.05–1.2)
MONOCYTES NFR BLD: 5 % (ref 3–10)
NEUTS SEG # BLD: 4.7 K/UL (ref 1.8–8)
NEUTS SEG NFR BLD: 81 % (ref 40–73)
PLATELET # BLD AUTO: 245 K/UL (ref 135–420)
PMV BLD AUTO: 12 FL (ref 9.2–11.8)
POTASSIUM SERPL-SCNC: 4.1 MMOL/L (ref 3.5–5.5)
PROT SERPL-MCNC: 6.7 G/DL (ref 6.4–8.2)
RBC # BLD AUTO: 5.04 M/UL (ref 4.2–5.3)
SODIUM SERPL-SCNC: 135 MMOL/L (ref 136–145)
WBC # BLD AUTO: 5.8 K/UL (ref 4.6–13.2)

## 2020-06-05 PROCEDURE — 96374 THER/PROPH/DIAG INJ IV PUSH: CPT

## 2020-06-05 PROCEDURE — 93005 ELECTROCARDIOGRAM TRACING: CPT

## 2020-06-05 PROCEDURE — 80053 COMPREHEN METABOLIC PANEL: CPT

## 2020-06-05 PROCEDURE — 87040 BLOOD CULTURE FOR BACTERIA: CPT

## 2020-06-05 PROCEDURE — 83605 ASSAY OF LACTIC ACID: CPT

## 2020-06-05 PROCEDURE — 71045 X-RAY EXAM CHEST 1 VIEW: CPT

## 2020-06-05 PROCEDURE — 74011250636 HC RX REV CODE- 250/636: Performed by: EMERGENCY MEDICINE

## 2020-06-05 PROCEDURE — 96375 TX/PRO/DX INJ NEW DRUG ADDON: CPT

## 2020-06-05 PROCEDURE — 99284 EMERGENCY DEPT VISIT MOD MDM: CPT

## 2020-06-05 PROCEDURE — 85025 COMPLETE CBC W/AUTO DIFF WBC: CPT

## 2020-06-05 RX ORDER — PREDNISONE 50 MG/1
50 TABLET ORAL DAILY
Qty: 5 TAB | Refills: 0 | Status: SHIPPED | OUTPATIENT
Start: 2020-06-05 | End: 2020-06-05

## 2020-06-05 RX ORDER — FAMOTIDINE 10 MG/ML
20 INJECTION INTRAVENOUS
Status: COMPLETED | OUTPATIENT
Start: 2020-06-05 | End: 2020-06-05

## 2020-06-05 RX ORDER — PREDNISONE 50 MG/1
50 TABLET ORAL DAILY
Qty: 5 TAB | Refills: 0 | Status: SHIPPED | OUTPATIENT
Start: 2020-06-05 | End: 2020-06-10

## 2020-06-05 RX ORDER — DEXAMETHASONE SODIUM PHOSPHATE 4 MG/ML
10 INJECTION, SOLUTION INTRA-ARTICULAR; INTRALESIONAL; INTRAMUSCULAR; INTRAVENOUS; SOFT TISSUE
Status: COMPLETED | OUTPATIENT
Start: 2020-06-05 | End: 2020-06-05

## 2020-06-05 RX ORDER — DIPHENHYDRAMINE HCL 25 MG
25 CAPSULE ORAL
Qty: 20 CAP | Refills: 0 | Status: SHIPPED | OUTPATIENT
Start: 2020-06-05 | End: 2020-06-05

## 2020-06-05 RX ORDER — DIPHENHYDRAMINE HCL 25 MG
25 CAPSULE ORAL
Qty: 20 CAP | Refills: 0 | Status: SHIPPED | OUTPATIENT
Start: 2020-06-05 | End: 2021-01-28 | Stop reason: ALTCHOICE

## 2020-06-05 RX ORDER — SODIUM CHLORIDE 0.9 % (FLUSH) 0.9 %
5-10 SYRINGE (ML) INJECTION AS NEEDED
Status: DISCONTINUED | OUTPATIENT
Start: 2020-06-05 | End: 2020-06-05 | Stop reason: HOSPADM

## 2020-06-05 RX ADMIN — SODIUM CHLORIDE 1000 ML: 900 INJECTION, SOLUTION INTRAVENOUS at 17:37

## 2020-06-05 RX ADMIN — DEXAMETHASONE SODIUM PHOSPHATE 10 MG: 4 INJECTION, SOLUTION INTRAMUSCULAR; INTRAVENOUS at 17:38

## 2020-06-05 RX ADMIN — FAMOTIDINE 20 MG: 10 INJECTION, SOLUTION INTRAVENOUS at 17:40

## 2020-06-05 NOTE — ED TRIAGE NOTES
Pt tx by urgent care 5/29 for axilla left sided abscess. Abscess in left axilla down to scab. Since last night broke out in rash. All over. Itches and feels on fire. Pt has fever and tachy. Recently took 2 benadryl 300 min ago. Seen by Dr. Abbey Gaston in triage.  Code sepsis

## 2020-06-05 NOTE — ED PROVIDER NOTES
[address] [facility]  EMERGENCY DEPARTMENT HISTORY AND PHYSICAL EXAM       Date: 6/5/2020   Patient Name: Radha Martin   YOB: 1995  Medical Record Number: [id number]    HISTORY OF PRESENTING ILLNESS:     Radha Martin is a 30 y.o. female presenting with the noted PMH to the ED c/o rash. Patient states she started having allergic reaction today. She states she is finishing up her antibiotics. She is taking Bactrim and Keflex for an abscess in her axillary area. She states that actually gotten better now. But then today noticed a rash. She states she also started having a different little bit difficulty breathing and swallowing earlier. She took 2 Benadryl's and it seems to do better now. She denies any chest pain or shortness of breath. Denies abdominal pain. Denies any urine or bowel changes. Denies any cough or cold symptoms. States has not had before. No antibiotic allergy that she was aware. Rest of systems reviewed and negative. Primary Care Provider: Lisa Cooley MD   Specialist:    Past Medical History:   Past Medical History:   Diagnosis Date    ADHD     Anxiety     Depression     Diabetes ([address]     Hypercholesterolemia     Insomnia     Thyroid disease         Past Surgical History:   History reviewed. No pertinent surgical history. Social History:   Social History     Tobacco Use    Smoking status: Never Smoker    Smokeless tobacco: Never Used   Substance Use Topics    Alcohol use: No    Drug use: Never        Allergies:   No Known Allergies     REVIEW OF SYSTEMS:  Review of Systems      PHYSICAL EXAM:  Vitals:    06/05/20 1820 06/05/20 1821 06/05/20 1822 06/05/20 1823   BP:       Pulse: (!) 102 100 (!) 107 (!) 102   Resp:       Temp:       SpO2:       Weight:       Height:           Physical Exam  Vital signs reviewed. Alert oriented x 3 in NAD. HEENT: normocephalic atraumatic. Eyes are PERRLA EOMI.   Conjunctiva normal.    External ears and nose normal.    Neck: normal external exam. No midline neck or back TTP. Lungs are clear to ascultation bilaterally. normal effort  Heart is regular rate and rhythm with no murmurs. Abdomen soft and nontender. No rebound rigidity or guarding. Extremities: Moves all 4 extremities and no distress. Full range of motion. 2+ pulses and BCR in all 4 extremities. Neuro: Normal gait. 5 out of 5 strength in all 4 extremities. No facial droop. Skin examination: intact. Maculopapular rash diffuse. malar facial redness. . No petechia or purpura. Medications   sodium chloride (NS) flush 5-10 mL (has no administration in time range)   sodium chloride 0.9 % bolus infusion 4,164 mL (1,000 mL IntraVENous New Bag 6/5/20 1737)   dexamethasone (DECADRON) 4 mg/mL injection 10 mg (10 mg IntraVENous Given 6/5/20 1738)   famotidine (PF) (PEPCID) injection 20 mg (20 mg IntraVENous Given 6/5/20 1740)       RESULTS:    Labs -   Labs Reviewed   METABOLIC PANEL, COMPREHENSIVE - Abnormal; Notable for the following components:       Result Value    Sodium 135 (*)     Glucose 204 (*)     Calcium 8.4 (*)     Albumin 3.0 (*)     All other components within normal limits   CBC WITH AUTOMATED DIFF - Abnormal; Notable for the following components:    MPV 12.0 (*)     NEUTROPHILS 81 (*)     LYMPHOCYTES 11 (*)     ABS. LYMPHOCYTES 0.7 (*)     All other components within normal limits   CULTURE, BLOOD   CULTURE, BLOOD   LACTIC ACID   URINALYSIS W/ RFLX MICROSCOPIC       Radiologic Studies -  No results found. EKG interpretation:   Done at 1718 read by myself as sinus tachycardia at 122 bpm.  No ST elevation. Normal axis. MEDICAL DECISION MAKING    1900. Patient reassessed. Feeling better. Rash improving. Heart rate improving. Patient states she is ready to go home. Discussed with patient about returning tomorrow to be checked if symptoms change or worsen. Also discussed with her about stopping the antibiotics. She states today was her last day anyway. Her about following up with allergist to be rechecked. Discussed with her about avoiding the antibiotics in the future. Patient states understanding. No evidence of angioedema or Dipak's angina. Patient's blood pressure elevated here. Discussed with patient about following up with her primary doctor to be rechecked. She states her blood pressure was checked yesterday and was normal.  Might still be along with the allergic reaction. No evidence of Bashir-Medardo's at this time. However patient will return if symptoms change or worsen. Patient agrees with plan. Patient has no new complaints, changes, or physical findings. Results were reviewed with the patient. Pt's questions and concerns were addressed. Care plan was outlined, including follow-up with PCP/specialist and return precautions were discussed. Patient is felt to be stable for discharge at this time. Diagnosis   Clinical Impression:   1. Allergic reaction, initial encounter           Follow-up Information     Follow up With Specialties Details Why Contact Info    Abhilash Silverio MD Internal Medicine Call in 2 days  [place] [id number] [phone]      [facility] EMERGENCY DEPT Emergency Medicine Go in 1 day If symptoms worsen, As needed [address]  [phone]          Current Discharge Medication List      START taking these medications    Details   predniSONE (DELTASONE) 50 mg tablet Take 1 Tab by mouth daily for 5 days. Qty: 5 Tab, Refills: 0      diphenhydrAMINE (BenadryL) 25 mg capsule Take 1 Cap by mouth every six (6) hours as needed for Itching or Skin Irritation for up to 20 doses. Qty: 20 Cap, Refills: 0             Discharged in stable and improved condition. This chart was completed using Dragon, a dictation transcription service. Errors may have resulted from using this device.

## 2020-06-05 NOTE — DISCHARGE INSTRUCTIONS
Thank you so much for visiting us today. Please make sure to call your doctor tomorrow to be rechecked in 1-3 days. Bring your results from here with you when you do. Return immediately if any fevers, headaches, chest pain, difficulty breathing, abdominal pain, worsening or changing symptoms, or any other concerns. Patient Education        Allergic Reaction: Care Instructions  Your Care Instructions     An allergic reaction is an excessive response from your immune system to a medicine, chemical, food, insect bite, or other substance. A reaction can range from mild to life-threatening. Some people have a mild rash, hives, and itching or stomach cramps. In severe reactions, swelling of your tongue and throat can close up your airway so that you cannot breathe. Follow-up care is a key part of your treatment and safety. Be sure to make and go to all appointments, and call your doctor if you are having problems. It's also a good idea to know your test results and keep a list of the medicines you take. How can you care for yourself at home? · If you know what caused your allergic reaction, be sure to avoid it. Your allergy may become more severe each time you have a reaction. · Take an over-the-counter antihistamine, such as cetirizine (Zyrtec) or loratadine (Claritin), to treat mild symptoms. Read and follow directions on the label. Some antihistamines can make you feel sleepy. Do not give antihistamines to a child unless you have checked with your doctor first. Mild symptoms include sneezing or an itchy or runny nose; an itchy mouth; a few hives or mild itching; and mild nausea or stomach discomfort. · Do not scratch hives or a rash. Put a cold, moist towel on them or take cool baths to relieve itching. Put ice packs on hives, swelling, or insect stings for 10 to 15 minutes at a time. Put a thin cloth between the ice pack and your skin. Do not take hot baths or showers.  They will make the itching worse.  · Your doctor may prescribe a shot of epinephrine to carry with you in case you have a severe reaction. Learn how to give yourself the shot and keep it with you at all times. Make sure it is not . · Go to the emergency room every time you have a severe reaction, even if you have used your shot of epinephrine and are feeling better. Symptoms can come back after a shot. · Wear medical alert jewelry that lists your allergies. You can buy this at most [employer]. · If your child has a severe allergy, make sure that his or her teachers, babysitters, coaches, and other caregivers know about the allergy. They should have an epinephrine shot, know how and when to give it, and have a plan to take your child to the hospital.  When should you call for help? Give an epinephrine shot if:  · You think you are having a severe allergic reaction. · You have symptoms in more than one body area, such as mild nausea and an itchy mouth. After giving an epinephrine shot call 911, even if you feel better. [id number] if:  · You have symptoms of a severe allergic reaction. These may include:  ? Sudden raised, red areas (hives) all over your body. ? Swelling of the throat, mouth, lips, or tongue. ? Trouble breathing. ? Passing out (losing consciousness). Or you may feel very lightheaded or suddenly feel weak, confused, or restless. · You have been given an epinephrine shot, even if you feel better. Call your doctor now or seek immediate medical care if:  · You have symptoms of an allergic reaction, such as:  ? A rash or hives (raised, red areas on the skin). ? Itching. ? Swelling. ? Belly pain, nausea, or vomiting. Watch closely for changes in your health, and be sure to contact your doctor if:  · You do not get better as expected. Where can you learn more? Go to http://ruddy-colin.info/  Enter P890 in the search box to learn more about \"Allergic Reaction: Care Instructions. \"  Current as of: October 7, 2019               Content Version: 12.5  © 7910-3408 Healthwise, Incorporated. Care instructions adapted under license by [employer] (which disclaims liability or warranty for this information). If you have questions about a medical condition or this instruction, always ask your healthcare professional. [address] any warranty or liability for your use of this information.

## 2020-06-06 ENCOUNTER — PATIENT OUTREACH (OUTPATIENT)
Dept: CASE MANAGEMENT | Age: 25
End: 2020-06-06

## 2020-06-06 LAB — C PEPTIDE SERPL-MCNC: 3.1 NG/ML (ref 1.1–4.4)

## 2020-06-06 NOTE — PROGRESS NOTES
Patient contacted regarding recent discharge and COVID-19 risk. Discussed COVID-19 related testing which was not done at this time. Test results were not done. Patient informed of results, if available? N/A    Care Transition Nurse/ Ambulatory Care Manager contacted the patient by telephone to perform post discharge assessment. Verified name and  with patient as identifiers. Patient has following risk factors of: diabetes. CTN/ACM reviewed discharge instructions, medical action plan and red flags related to discharge diagnosis. Reviewed and educated them on any new and changed medications related to discharge diagnosis. Advised obtaining a 90-day supply of all daily and as-needed medications. Education provided regarding infection prevention, and signs and symptoms of COVID-19 and when to seek medical attention with patient who verbalized understanding. Discussed exposure protocols and quarantine from [address] you at higher risk for severe illness  and given an opportunity for questions and concerns. The patient agrees to contact the COVID-19 hotline [phone] or PCP office for questions related to their healthcare. CTN/ACM provided contact information for future reference. From CDC: Are you at higher risk for severe illness?  Wash your hands often.  Avoid close contact (6 feet, which is about two arm lengths) with people who are sick.  Put distance between yourself and other people if COVID-19 is spreading in your community.  Clean and disinfect frequently touched surfaces.  Avoid all cruise travel and non-essential air travel.  Call your healthcare professional if you have concerns about COVID-19 and your underlying condition or if you are sick.     For more information on steps you can take to protect yourself, see CDC's How to Protect Yourself      Patient/family/caregiver given information for Earle Roque and agrees to enroll no     Plan for follow-up call in - days based on severity of symptoms and risk factors.

## 2020-06-08 LAB
ATRIAL RATE: 122 BPM
CALCULATED P AXIS, ECG09: 34 DEGREES
CALCULATED R AXIS, ECG10: 13 DEGREES
CALCULATED T AXIS, ECG11: 9 DEGREES
DIAGNOSIS, 93000: NORMAL
P-R INTERVAL, ECG05: 128 MS
Q-T INTERVAL, ECG07: 308 MS
QRS DURATION, ECG06: 72 MS
QTC CALCULATION (BEZET), ECG08: 438 MS
VENTRICULAR RATE, ECG03: 122 BPM

## 2020-06-09 LAB — GAD65 AB SER-ACNC: <5 U/ML (ref 0–5)

## 2020-06-10 ENCOUNTER — HOSPITAL ENCOUNTER (OUTPATIENT)
Dept: NUTRITION | Age: 25
Discharge: HOME | End: 2020-06-10
Payer: COMMERCIAL

## 2020-06-10 PROCEDURE — 97802 MEDICAL NUTRITION INDIV IN: CPT

## 2020-06-10 NOTE — PROGRESS NOTES
[facility] - Outpatient Nutrition Services  [address] [place] [address]. [facility], [address] [place] [address]   Nutrition Assessment  Medical Nutrition Therapy   Outpatient Initial Evaluation         Patient Name: Samantha Mei : 1995   Treatment Diagnosis: Type II Diabetes (uncontrolled), Mor Obesity   Referral Source: Edwardo Ibrahim MD Start of Care [facility] [place] operated [facility]): 6/10/2020     Gender: female Age: 30 y.o. Ht: 64 in Wt:   315 (3/3/20) lb  kg   BMI: 54.1 BMR   Male  BMR Female 3159     Past Medical History:  Includes Hypothyroidism, Depression, Adult ADD, Hyperlipidemia     Pertinent Medications:   Includes Lantus insulin 32 units 1x/d, Synthroid, Lipitor, Buspar, Adderall XR, Fluoxetine, Melatonin, Trazadone     Biochemical Data:   Lab Results   Component Value Date/Time    Hemoglobin A1c 10.1 (H) 2020 08:20 AM    Hemoglobin A1c, External 11.3 10/10/2019     Lab Results   Component Value Date/Time    Sodium 135 (L) 2020 05:30 PM    Potassium 4.1 2020 05:30 PM    Chloride 105 2020 05:30 PM    CO2 26 2020 05:30 PM    Anion gap 4 2020 05:30 PM    Glucose 204 (H) 2020 05:30 PM    BUN 14 2020 05:30 PM    Creatinine 0.73 2020 05:30 PM    BUN/Creatinine ratio 19 2020 05:30 PM    GFR est AA >60 2020 05:30 PM    GFR est non-AA >60 2020 05:30 PM    Calcium 8.4 (L) 2020 05:30 PM    Bilirubin, total 0.6 2020 05:30 PM    Alk.  phosphatase 79 2020 05:30 PM    Protein, total 6.7 2020 05:30 PM    Albumin 3.0 (L) 2020 05:30 PM    Globulin 3.7 2020 05:30 PM    A-G Ratio 0.8 2020 05:30 PM    ALT (SGPT) 19 2020 05:30 PM     Lab Results   Component Value Date/Time    Cholesterol, total 165 2020 08:20 AM    HDL Cholesterol 33 (L) 2020 08:20 AM    LDL, calculated 111.2 (H) 2020 08:20 AM    VLDL, calculated 20.8 2020 08:20 AM    Triglyceride 104 2020 08:20 AM    CHOL/HDL Ratio 5.0 06/04/2020 08:20 AM     Lab Results   Component Value Date/Time    ALT (SGPT) 19 06/05/2020 05:30 PM    Alk. phosphatase 79 06/05/2020 05:30 PM    Bilirubin, total 0.6 06/05/2020 05:30 PM     Lab Results   Component Value Date/Time    Creatinine 0.73 06/05/2020 05:30 PM     Lab Results   Component Value Date/Time    BUN 14 06/05/2020 05:30 PM     Lab Results   Component Value Date/Time    Microalbumin/Creat ratio (mg/g creat) 52 (H) 02/28/2020 11:18 AM    Microalbumin,urine random 6.63 (H) 02/28/2020 11:18 AM        Assessment:    Kaitlin Christie is a 30 y.o. female being evaluated by a Virtual Visit (video visit) encounter to address concerns as mentioned above. A caregiver was present when appropriate. Due to this being a TeleHealth encounter (During [place] [address] public [facility] emergency), evaluation of the following organ systems was limited: Vitals/Constitutional/EENT/Resp/CV/GI//MS/Neuro/Skin/Heme-Lymph-Imm. Pursuant to the emergency declaration under the [facility], [address] [place] [address] authority and the [employer] Act, this Virtual Visit was conducted with patient's (and/or legal guardian's) consent, to reduce the risk of exposure to COVID-19 and provide necessary medical care. Services were provided through a video synchronous discussion virtually to substitute for in-person encounter. --Yuliya Otto RD on 6/10/2020 at 12:56 PM  An electronic signature was used to authenticate this note. Pt is a 30 y.o. female. Single and currently living with her parents. Was working on a Master's degree in the late winter but did not finish due to mental health issues. Since then, and especially since the [address], Pt admits to no structure to her days. Not currently working or going to school. Pt hopes to go back to school in the spring to possibly study nursing.   Pt generally does not wake until about 4 p.m. and stays up the majority of the night. No regular eating pattern doing this time. Was walking before COVID19 but admits to not doing any walking at all right now. Diagnosed with Type II DM in October of 2019 with an A1C well over 11. Pt originially started on just Lantus and this continues. Pt admits to not doing much SBGM as she needs a new meter-occasionally uses her mother's meter. When she does test, her BG is always over 200 regardless of the testing time. Pt just completed a course of steroids due to an allergy to anti-biotics and during this time her BG was as high as 500. Since weaning off the steroids, these seem to be coming down. Current BMI puts pt in the morbid obesity category. Pt is looking to control weight but for life long health and not for a \"diet\" to help keep her motivated and not trigger her anxiety. Food & Nutrition: As above, pt has very sporadic sleeping habits and therefore very sporadic eating habits. Usually is eating something every 1-1.5 hours when she is awake. Most feedings are excessive in carbohydrates. Pt drinks primarily calorie free beverages with the exception of a very occasionally fruit juice or lemonade. Estimate Needs   Calories:   1500 Protein: 94 Carbs: 169 Fat: 50   Kcal/day  g/day  g/day  g/day        percent: 25  45  30               Nutrition Diagnosis Altered nutrition-related lab values (Hg A1c)  related to endocrine dysfunction as evidenced by  abnormal glucose level & Elevated Hg A1c. Food- and nutrition related knowledge deficit (lack of information and noncompliance with modified diet).         Nutrition Intervention &  Education: -  -Educated pt on the pathophysiology of Type II Diabetes and insulin resistance and the rationale for dietary modification and increased activity  -Encouraged pt not to go longer than 5 hours without eating but to space meals/snacks at least 3 hours apart.-Discussed the importance of adding some structure to eating and sleeping day. .  -Educated pt on all carbohydrates found in foods and encouraged no more than 35-40 gm/meal and 15-20 gm/snack.  -Encouraged pt to vary the times testing blood glucose even if only testing one time per day. Provided a suggested testing schedule.-Per MD instructions, take a fasting blood glucose daily to determine adjustment of Lantus dose every 3 days. Handouts Provided: [x]  Carbohydrates  [x]  Protein  [x]  Non-starchy Vegatbles  []  Food Label  []  Meal and Snack Ideas  []  Food Journals [x]  Diabetes  []  Cholesterol  []  Sodium  [x]  Gen Nutr Guidelines  [x]  SBGM Guidelines  []  Others:   Information Reviewed with: Patient   Readiness to Change Stage: []  Pre-contemplative    []  Contemplative  []  Preparation               [x]  Action                  []  Maintenance   Potential Barriers to Learning: []  Decline in memory    []  Language barrier   []  Other:  []  Emotional                  []  Limited mobility  []  Lack of motivation     [] Vision, hearing or cognitive impairment   Expected Compliance: Fair     Nutritional Goal - To promote lifestyle changes to result in:    [x]  Weight loss  [x]  Improved diabetic control  [x]  Decreased cholesterol levels  []  Decreased blood pressure  []  Weight maintenance []  Preventing any interactions associated with food allergies  []  Adequate weight gain toward goal weight  []  Other:        Patient Goals:   -Per MD instructions-pt will increase her Lantus dose by 3 units, every 3 dayss until glucose levels are under 200 -follow up with provider for potential other medication changes.  -Pt will write down the times she eats throughout her waking hours at least 5 days out of 7.  -Pt will strive for a minimum of 180 minutes of walking per week     Dietitian Signature: Patricia Vo MS,RD,[facility] Date: 6/10/2020   Follow-up: Tuesday, 6- at 9 a.m.  Time: 10:20 AM

## 2020-06-11 LAB
BACTERIA SPEC CULT: NORMAL
BACTERIA SPEC CULT: NORMAL
SERVICE CMNT-IMP: NORMAL
SERVICE CMNT-IMP: NORMAL

## 2020-06-18 ENCOUNTER — TELEPHONE (OUTPATIENT)
Dept: FAMILY MEDICINE CLINIC | Facility: CLINIC | Age: 25
End: 2020-06-18

## 2020-06-18 NOTE — TELEPHONE ENCOUNTER
Please advise pt she is a type 2 diabetic. Her A1c is up to 10.1 now. Has she heard from endo on an appt?

## 2020-06-22 ENCOUNTER — PATIENT OUTREACH (OUTPATIENT)
Dept: CASE MANAGEMENT | Age: 25
End: 2020-06-22

## 2020-06-22 NOTE — PROGRESS NOTES
Patient resolved from Transition of Care episode on 6/22/20. COVID-19 related testing which was not done at this time. Test results were not done. Patient informed of results, if available? N/A     Attempted to reach patient. No answer. Left message and provided the following resources and education related to COVID-19:                         Signs, symptoms and red flags related to COVID-19            CDC exposure and quarantine guidelines            [facility]it exposure contact - [phone]            Contact for their local Department of Health                No further outreach scheduled with this CTN/ACM/LPN/HC/ MA. Episode of Care resolved. Patient has this CTN/ACM/LPN/HC/MA contact information if future needs arise.

## 2020-06-23 ENCOUNTER — HOSPITAL ENCOUNTER (OUTPATIENT)
Dept: NUTRITION | Age: 25
Discharge: HOME | End: 2020-06-23
Payer: COMMERCIAL

## 2020-06-23 PROCEDURE — 97803 MED NUTRITION INDIV SUBSEQ: CPT

## 2020-06-23 NOTE — PROGRESS NOTES
NUTRITION  FOLLOW-UP TREATMENT NOTE  Patient Name: Francisca Quach         Date: 2020  : 1995    YES/NO Patient  Verified  Diagnosis: Diabetes, Mor Obesity   In time:   900             Out time:   930   Total Treatment Time (min):   30     SUBJECTIVE/ASSESSMENT    Current Wt: 301 (per pt-home scale) Previous Wt: 315         ( 3-3-20) Wt Change: -14#     Initial Wt: 315 Total Wt change: -14# Height: 64\"     Changes in medication or medical history? Any new allergies, surgeries or procedures? YES/NO    If yes, update Summary List   Francisca Quach is a 30 y.o. female being evaluated by a Virtual Visit (video visit) encounter to address concerns as mentioned above. A caregiver was present when appropriate. Due to this being a TeleHealth encounter (During DUTUT-92 public health emergency), evaluation of the following organ systems was limited: Vitals/Constitutional/EENT/Resp/CV/GI//MS/Neuro/Skin/Heme-Lymph-Imm. Pursuant to the emergency declaration under the [address] [place] [address], [address] [place] [address] authority and the "[employer] and [employer] Act, this Virtual Visit was conducted with patient's (and/or legal guardian's) consent, to reduce the risk of exposure to COVID-19 and provide necessary medical care. Services were provided through a video synchronous discussion virtually to substitute for in-person encounter. --Xavier Whitaker, RD on 2020 at 10:31 AM  An electronic signature was used to authenticate this note. Pt has weaned off the Prednisone and finds that her appetite is finally decreased. Admits to not doing SBGM every day but did purchase a new meter and strips at [facility]. She has been increasing her Lantus dose when she does her FBS and it is 200 or over (waiting 3 days in between increases). Her dose is now up to 44 units.             Nutrition Diagnosis        Diagnosis Status:  Altered nutrition -related lab values (HGB A1C) related to endocrine dysfunction as evidenced by abnormal glucose levels. Noncompliance with modified diet. [x]  Improved []  No Change    []  Declined        Nutrition Monitoring and Evaluation: -Pt has been much more conscious of the times she is eating but recognizes that she is either eating too close together or more than 5 hours without eating. Nutrition Prescription and  Intervention -Reminded pt of eating timing and the effect on hunger, blood glucose control and weight loss. Reviewed the signs and sxs of hyperglycemia and reminded pt that hunger can be a sign of high blood glucose and encouraged her to do SBGM at this time. Patient Education:  [x]  Review current plan with patient   []  Other:    Handouts/  Information Provided: []  Carbohydrates  []  Protein  []  Fiber  []  Serving Sizes  []  Fluids  []  General guidelines []  Diabetes  []  Cholesterol  []  Sodium  []  SBGM  []  Food Journals  []  Others:        Patient Goals -Pt will commit to testing her BG at least every morning (fasting)   -Pt will continue to write down the times she eats and set her phone alarm accordingly for the next time she should eat to both prevent her from eating too often or going too long without eating.        PLAN    []  Continue on current plan []  Follow-up PRN   []  Discharge due to :    [x]  Next appt: Monday, 7- at 9 (virtual)     Dietitian: Yash Silva MS,RD, [facility]    Date: 6/23/2020 Time: 9:42 AM

## 2020-06-27 NOTE — PROGRESS NOTES
HISTORY OF PRESENT ILLNESS  Kaitlin Toth is a 30 y.o. female. HPI   Pt is being seen via doxy. me for f/u on her diabetes, thyroid, and htn. She states she has not been eating well lately and has not been exercising. Her BS has mayco averaging in the 200's and as high as 453 and 573. She has never seen a dietician so will refer her to one. She is also out of insulin needles and needs a glucometer. Will also refer her to endo. No Known Allergies    Current Outpatient Medications   Medication Sig    Blood-Glucose Meter monitoring kit Use as directed    lancets misc Qid BS testing    glucose blood VI test strips (ASCENSIA AUTODISC VI, ONE TOUCH ULTRA TEST VI) strip Qid BS testing    Insulin Needles, Disposable, 31 gauge x 5/16\" ndle 1 Each by SubCUTAneous route daily.  diphenhydrAMINE (BenadryL) 25 mg capsule Take 1 Cap by mouth every six (6) hours as needed for Itching or Skin Irritation for up to 20 doses.  Lantus Solostar U-100 Insulin 100 unit/mL (3 mL) inpn 32 Units by SubCUTAneous route daily for 90 days.  levothyroxine (SYNTHROID) 50 mcg tablet Take 1 Tab by mouth daily.  atorvastatin (LIPITOR) 20 mg tablet TAKE 1 TABLET BY MOUTH EVERYDAY AT BEDTIME    busPIRone (BUSPAR) 10 mg tablet Take 1 Tab by mouth.  amphetamine-dextroamphetamine XR (ADDERALL XR) 15 mg XR capsule     FLUoxetine 60 mg tab     melatonin 10 mg tab 200 mg.  traZODone (DESYREL) 50 mg tablet     D3-folic acid-collagen,bovine, 800 unit-1 mg- 300 mg cap Take 1 Cap by mouth.  magnesium 250 mg tab Take 1 Tab by mouth. No current facility-administered medications for this visit. Review of Systems   Constitutional: Negative. Negative for chills, fever and malaise/fatigue. HENT: Negative. Negative for congestion, ear pain, sore throat and tinnitus. Eyes: Negative. Negative for blurred vision, double vision and photophobia. Respiratory: Negative. Negative for cough, shortness of breath and wheezing. Cardiovascular: Negative. Negative for chest pain, palpitations and leg swelling. Gastrointestinal: Negative. Negative for abdominal pain, heartburn, nausea and vomiting. Genitourinary: Negative. Negative for dysuria, frequency, hematuria and urgency. Musculoskeletal: Negative. Negative for back pain, joint pain, myalgias and neck pain. Skin: Negative. Negative for itching and rash. Neurological: Negative. Negative for dizziness, tingling, tremors and headaches. Psychiatric/Behavioral: Negative. Negative for depression and memory loss. The patient is not nervous/anxious and does not have insomnia. Physical Exam  Constitutional:       General: She is not in acute distress. Appearance: Normal appearance. She is obese. She is not ill-appearing. HENT:      Head: Normocephalic and atraumatic. Neurological:      Mental Status: She is alert and oriented to person, place, and time. Psychiatric:         Mood and Affect: Mood normal.         Behavior: Behavior normal.         Thought Content: Thought content normal.         Judgment: Judgment normal.         ASSESSMENT and PLAN    ICD-10-CM ICD-9-CM    1. Type 2 diabetes mellitus with hyperglycemia, with long-term current use of insulin ([facility]) E11.65 250.00 REFERRAL TO DIABETIC EDUCATION    Z79.4 790.29 REFERRAL TO ENDOCRINOLOGY     V58.67 GLUTAMIC ACID DECARB AB      C-PEPTIDE      HEMOGLOBIN A1C WITH EAG      METABOLIC PANEL, COMPREHENSIVE      Blood-Glucose Meter monitoring kit      lancets misc      glucose blood VI test strips (ASCENSIA AUTODISC VI, ONE TOUCH ULTRA TEST VI) strip      Insulin Needles, Disposable, 31 gauge x 5/16\" ndle   2. Morbid obesity due to excess calories ([facility]) D19.28 467.60 METABOLIC PANEL, COMPREHENSIVE   3. Dyslipidemia E78.5 272.4 LIPID PANEL      METABOLIC PANEL, COMPREHENSIVE   4.  Acquired hypothyroidism E03.9 244.9 TSH 3RD GENERATION      T4, FREE      CBC WITH AUTOMATED DIFF     Follow-up and Dispositions · Return in about 3 months (around 9/2/2020) for follow up. Pt expressed understanding of visit summary and plans for any follow ups or referrals as well as any medications prescribed. Seen by synchronous (real-time) audio-video technology via doxy. me on 06/02/2020  . The patient is aware that this patient-initiated Telehealth encounter is a billable service, with coverage as determined by his or her insurance carrier. He/She is aware that they may receive a bill and has provided verbal consent to proceed: Yes        I was in the office while conducting this encounter.

## 2020-06-27 NOTE — PATIENT INSTRUCTIONS
Hypothyroidism: Care Instructions Your Care Instructions When you have hypothyroidism, your body doesn't make enough thyroid hormone. This hormone helps your body use energy. If your thyroid level is low, you may feel tired, be constipated, have an increase in your blood pressure, or have dry skin or memory problems. You may also get cold easily, even when it is warm. Women with low thyroid levels may have heavy menstrual periods. A blood test to find your thyroid-stimulating hormone (TSH) level is used to check for hypothyroidism. A high TSH level may mean that you have it. The treatment for hypothyroidism is thyroid hormone pills. You should start to feel better in 1 to 2 weeks. Most people need treatment for the rest of their lives. You will need regular visits with your doctor to make sure you are doing well and that you have the right dose of medicine. Follow-up care is a key part of your treatment and safety. Be sure to make and go to all appointments, and call your doctor if you are having problems. It's also a good idea to know your test results and keep a list of the medicines you take. How can you care for yourself at home? · Take your thyroid hormone medicine exactly as prescribed. Call your doctor if you think you are having a problem with your medicine. Most people do not have side effects if they take the right amount of medicine regularly. ? Take the medicine 30 minutes before breakfast, and do not take it with calcium, vitamins, or iron. ? Do not take extra doses of your thyroid medicine. It will not help you get better any faster, and it may cause side effects. ? If you forget to take a dose, do NOT take a double dose of medicine. Take your usual dose the next day. · Tell your doctor about all prescription, herbal, or over-the-counter products you take. · Take care of yourself. Eat a healthy diet, get enough sleep, and get regular exercise. When should you call for help? [id number] anytime you think you may need emergency care. For example, call if: 
· You passed out (lost consciousness). · You have severe trouble breathing. · You have a very slow heartbeat (less than 60 beats a minute). · You have a low body temperature (95°F or below). Call your doctor now or seek immediate medical care if: 
· You feel tired, sluggish, or weak. · You have trouble remembering things or concentrating. · You do not begin to feel better 2 weeks after starting your medicine. Watch closely for changes in your health, and be sure to contact your doctor if you have any problems. Where can you learn more? Go to http://ruddy-colin.info/ Enter I920 in the search box to learn more about \"Hypothyroidism: Care Instructions. \" Current as of: July 29, 2019               Content Version: 12.5 © 5715-7903 [employer]. Care instructions adapted under license by [employer] (which disclaims liability or warranty for this information). If you have questions about a medical condition or this instruction, always ask your healthcare professional. [address] any warranty or liability for your use of this information.

## 2020-07-13 ENCOUNTER — HOSPITAL ENCOUNTER (OUTPATIENT)
Dept: NUTRITION | Age: 25
Discharge: HOME | End: 2020-07-13
Payer: COMMERCIAL

## 2020-07-13 PROCEDURE — 97803 MED NUTRITION INDIV SUBSEQ: CPT

## 2020-07-13 NOTE — PROGRESS NOTES
NUTRITION  FOLLOW-UP TREATMENT NOTE  Patient Name: Jeancarlos Thomas         Date: 2020  : 1995    YES/NO Patient  Verified  Diagnosis: Diabetes, Morbid Obesity   In time:   26             Out time:   930   Total Treatment Time (min):   30     SUBJECTIVE/ASSESSMENT    Current Wt: 305 Previous Wt: 301 Wt Change: +4#     Initial Wt: 315# Total Wt change: -10# Height: 64\"     Changes in medication or medical history? Any new allergies, surgeries or procedures? YES/NO    If yes, update Summary List     Jeancarlos Thomas is a 30 y.o. female being evaluated by a Virtual Visit (video visit) encounter to address concerns as mentioned above. A caregiver was present when appropriate. Due to this being a TeleHealth encounter (During [facility] public health emergency), evaluation of the following organ systems was limited: Vitals/Constitutional/EENT/Resp/CV/GI//MS/Neuro/Skin/Heme-Lymph-Imm. Pursuant to the emergency declaration under the [address] [place] [address], [address] [place] [address] authority and the [employer] and [employer] Act, this Virtual Visit was conducted with patient's (and/or legal guardian's) consent, to reduce the risk of exposure to COVID-19 and provide necessary medical care. Services were provided through a video synchronous discussion virtually to substitute for in-person encounter. --Jorge Alberto Pastrana RD on 2020 at 5:20 PM  An electronic signature was used to authenticate this note. Pt saw an endocrinology practice [facility]). Started Trulicity 1x/week in addition to 44 units of Lantus. Has noticed that her appetite has been down-occ going all day without eating. Still often staying up all night and sleeping much of the day. Admits to often forgetting some of her medications on these days. Pt reports increased anxiety.   Will be following up with her psychiatrist.              Nutrition Diagnosis        Diagnosis Status: Altered Nutrition related lab values (HgbA1c) related to endocrine dysfunction as evidenced by abnormal glucose levls. Noncompliance with modified diet. [x]  Improved []  No Change    []  Declined        Nutrition Monitoring and Evaluation: As above, pt occasionally going too long without eating. Also continues to have unstructured sleeping and eating. Carbohydrates at one sitting end up being more than the recommended 40 gms. Nutrition Prescription and  Intervention -Pt will continue to set her phone so she does not eat closer together than 3 hours and no longer than 5 hrs. Reviewed carbohydrate counting of meals. Patient Education:  []  Review current plan with patient   []  Other:    Handouts/  Information Provided: []  Carbohydrates  []  Protein  []  Fiber  []  Serving Sizes  []  Fluids  []  General guidelines []  Diabetes  []  Cholesterol  []  Sodium  []  SBGM  []  Food Journals  []  Others:        Patient Goals -Pt will wake by 1 p.m. at least 5 days out of 7 to begin adding more structure to her days and nights. PLAN    []  Continue on current plan []  Follow-up PRN   []  Discharge due to :    [x]  Next appt:  Wednesday, July 29th, 2020 at 9 a.m. (virtual)     Dietitian: Tonio Hackett MS,RD, [facility]    Date: 7/13/2020 Time: 5:15 PM

## 2020-07-29 ENCOUNTER — HOSPITAL ENCOUNTER (OUTPATIENT)
Dept: NUTRITION | Age: 25
Discharge: HOME | End: 2020-07-29
Payer: COMMERCIAL

## 2020-07-29 PROCEDURE — 97803 MED NUTRITION INDIV SUBSEQ: CPT

## 2020-07-29 NOTE — PROGRESS NOTES
NUTRITION  FOLLOW-UP TREATMENT NOTE  Patient Name: Elias Walker         Date: 2020  : 1995    YES/NO Patient  Verified  Diagnosis: Diabetes, Morbid Obesity   In time:   26             Out time:   930   Total Treatment Time (min):   30     SUBJECTIVE/ASSESSMENT    Current Wt:  Previous Wt: 305\" Wt Change:      Initial Wt:  Total Wt change:  Height: 64\"     Changes in medication or medical history? Any new allergies, surgeries or procedures? YES/NO    If yes, update Summary List   Elias Walker is a 30 y.o. female being evaluated by a Virtual Visit (video visit) encounter to address concerns as mentioned above. A caregiver was present when appropriate. Due to this being a TeleHealth encounter (During [facility]-37 public health emergency), evaluation of the following organ systems was limited: Vitals/Constitutional/EENT/Resp/CV/GI//MS/Neuro/Skin/Heme-Lymph-Imm. Pursuant to the emergency declaration under the [address] [place] [address], [address] [facility] authority and the [employer] and [employer] Act, this Virtual Visit was conducted with patient's (and/or legal guardian's) consent, to reduce the risk of exposure to COVID-19 and provide necessary medical care. Services were provided through a video synchronous discussion virtually to substitute for in-person encounter. --Kelli Jackson RD on 2020 at 11:00 AM  An electronic signature was used to authenticate this note. Pt did follow up with her psychiatrist.  Irais Peraltaber increased to 15 mg 3x/d. Pt does not yet notice any change in her anxiety level. Pt has noticed her a.m. FBS is trending downward since she started the weekly trulicity. Pt does notice a decrease in appetite but she occasionally goes longer than 10 hours without eating and then expectedly overeating.               Nutrition Diagnosis        Diagnosis Status: Altered nutrition related lab values (HgbA1C) related to endocrine dysfunction as evidenced by abnormal glucose levels. Noncompliance with modified diet. [x]  Improved []  No Change    []  Declined        Nutrition Monitoring and Evaluation:  Pt does notice a decrease in appetite but she occasionally goes longer than 10 hours without eating and then expectedly overeating. Pt's eating is still very inconsistent. Nutrition Prescription and  Intervention -Enc pt to track her eating times and not go longer than 5 hrs without eating. Defined \"Bare minimum\" when she does not feel like eating -15 gm carbs, 7-8 gm protein-provided examples.          Patient Education:  [x]  Review current plan with patient   []  Other:    Handouts/  Information Provided: []  Carbohydrates  []  Protein  []  Fiber  []  Serving Sizes  []  Fluids  []  General guidelines []  Diabetes  []  Cholesterol  []  Sodium  []  SBGM  []  Food Journals  []  Others:        Patient Goals -Pt will wake by 1 p.m. at least 5 days out of 7 (continued)     PLAN    []  Continue on current plan []  Follow-up PRN   []  Discharge due to :    [x]  Next appt: Tuesday, 9-1-2020 at 9 a.m. (virtual)     Dietitian: Dakota Shannon MS,RD, [facility]    Date: 7/29/2020 Time: 9:49 AM

## 2020-08-04 RX ORDER — LEVOTHYROXINE SODIUM 50 UG/1
50 TABLET ORAL DAILY
Qty: 90 TAB | Refills: 0 | Status: SHIPPED | OUTPATIENT
Start: 2020-08-04

## 2020-09-01 ENCOUNTER — HOSPITAL ENCOUNTER (OUTPATIENT)
Dept: NUTRITION | Age: 25
Discharge: HOME | End: 2020-09-01
Payer: COMMERCIAL

## 2020-09-01 PROCEDURE — 97803 MED NUTRITION INDIV SUBSEQ: CPT

## 2020-09-01 NOTE — PROGRESS NOTES
NUTRITION  FOLLOW-UP TREATMENT NOTE  Patient Name: Farzana Interiano         Date: 2020  : 1995    YES/NO Patient  Verified  Diagnosis: Diabetes, Morbid Obesity   In time:   26             Out time:   930   Total Treatment Time (min):   30     SUBJECTIVE/ASSESSMENT    Current Wt: 308 (at endo office) Previous Wt: 301 (at home 7-29) Wt Change: +7#     Initial Wt:  Total Wt change:  Height: 64     Changes in medication or medical history? Any new allergies, surgeries or procedures? YES/NO    If yes, update Summary List     Farzana Interiano is a 30 y.o. female being evaluated by a Virtual Visit (video visit) encounter to address concerns as mentioned above. A caregiver was present when appropriate. Due to this being a TeleHealth encounter (During  public health emergency), evaluation of the following organ systems was limited: Vitals/Constitutional/EENT/Resp/CV/GI//MS/Neuro/Skin/Heme-Lymph-Imm. Pursuant to the emergency declaration under the [address] [place] [address] and the [employer] and [employer] Act, this Virtual Visit was conducted with patient's (and/or legal guardian's) consent, to reduce the risk of exposure to COVID-19 and provide necessary medical care. Services were provided through a video synchronous discussion virtually to substitute for in-person encounter. --Jairo Horton RD on 2020 at 11:25 AM  An electronic signature was used to authenticate this note. Pt saw her endocrinology nurse practitioner last week. A1C was improved at 8.1. Pt continues on Trulicity and Lantus. Per her NP at endo-pt will slowly start to decrease her Lantus dose every 4-5 days as long as her FBS readings continue to be under 125. 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   Pt was feeling depressed and disconnected most of August with her structure of eating and sleeping deteriorating. Per patient, she is slowly getting back on track. Has started walking with her dog most days. Nutrition Diagnosis        Diagnosis Status:   Altered nutrition related lab values (HgbA1C) related to endocrine dysfunction as evidenced by abnormal glucose levels. Noncompliance with modified diet. [x]  Improved []  No Change    []  Declined        Nutrition Monitoring and Evaluation: -Pt does report of overeating some binge type foods (ice cream, cookies) but has now made a conscious decision to keep these foods out of the house so she has to leave the house to obtain these. Pt has been better about not going too long without eating (especially in the last couple of weeks). Nutrition Prescription and  Intervention -Pt encouraged to continue work with her therapist on coping with social changes in her life. -Pt will continue to set alarms on her phone for eating times and adhere to her carbohydrate budgets for meals and snacks. Patient Education:  [x]  Review current plan with patient   []  Other:    Handouts/  Information Provided: []  Carbohydrates  []  Protein  []  Fiber  []  Serving Sizes  []  Fluids  []  General guidelines []  Diabetes  []  Cholesterol  []  Sodium  []  SBGM  []  Food Journals  []  Others:        Patient Goals -Pt will work up to a minimum of 180 minutes of walking per week.          PLAN    []  Continue on current plan []  Follow-up PRN   []  Discharge due to :    [x]  Next appt: Tuesday, 10- at 9 a.m. (virtual)     Dietitian: Gaila Schlatter, MS,RD, [facility]    Date: 9/1/2020 Time: 9:45 AM

## 2020-10-20 ENCOUNTER — HOSPITAL ENCOUNTER (OUTPATIENT)
Dept: NUTRITION | Age: 25
Discharge: HOME | End: 2020-10-20
Payer: COMMERCIAL

## 2020-10-20 PROCEDURE — 97803 MED NUTRITION INDIV SUBSEQ: CPT

## 2020-10-20 NOTE — PROGRESS NOTES
NUTRITION  FOLLOW-UP TREATMENT NOTE  Patient Name: Antonio Pierre         Date: 10/20/2020  : 1995    YES/NO Patient  Verified  Diagnosis: Diabetes, Mor Obesity   In time:   900             Out time:   945   Total Treatment Time (min):   45     SUBJECTIVE/ASSESSMENT    Changes in medication or medical history? Any new allergies, surgeries or procedures? YES/NO    If yes, update Summary List   Antonio Pierre is a 30 y.o. female being evaluated by a Virtual Visit (video visit) encounter to address concerns as mentioned above. A caregiver was present when appropriate. Due to this being a TeleHealth encounter (During [facility]-81 public health emergency), evaluation of the following organ systems was limited: Vitals/Constitutional/EENT/Resp/CV/GI//MS/Neuro/Skin/Heme-Lymph-Imm. Pursuant to the emergency declaration under the [address] [place] [address], [address] [place] [address] and the [employer] and [employer] Act, this Virtual Visit was conducted with patient's (and/or legal guardian's) consent, to reduce the risk of exposure to COVID-19 and provide necessary medical care. Services were provided through a video synchronous discussion virtually to substitute for in-person encounter. --Randa Morrow RD on 10/20/2020 at 11:07 AM    An electronic signature was used to authenticate this note. Pt admits to increased depression this month. Psychiatrist has added Wellbutrin to her medications (150 mg). Pt also admits to self medicating with food (especially fast food) when she is feeling particularly down. Sleeping/wake schedule is getting slowly more predictable with patient most often sleeping from 5 p.m. to 12 noon. Pt will be following up with her endocrinology office on 11/10/2020. Current Wt:  Previous Wt:  Wt Change:      Achievement of Goals: -Pt did achieve her goal of obtaining at least 180 minutes of exercise per week.      Patient Education:  [x]  Review current plan with patient   []  Other:    Handouts/  Information Provided: []  Carbohydrates  []  Protein  []  Fiber  []  Serving Sizes  []  Fluids  []  General guidelines []  Diabetes  []  Cholesterol  []  Sodium  []  SBGM  []  Food Journals  []  Others:      New Patient Goals: -Pt will set her alarm for 12 noon (understanding that she may not always hear it) and aim to be more consistent with going to sleep at 5 p.m.    -Pt will limit fast food visits to 2 per week and work on being intentional with her ordering and not having an \"all or nothing\" perspective when ordering food. .. PLAN    []  Continue on current plan []  Follow-up PRN   []  Discharge due to :    [x]  Next appt: Tuesday, 11- at 9 a.m.      Dietitian: Sriram Caro MS,RD,CDE    Date: 10/20/2020 Time: 10:34 AM

## 2020-11-17 ENCOUNTER — HOSPITAL ENCOUNTER (OUTPATIENT)
Dept: NUTRITION | Age: 25
Discharge: HOME | End: 2020-11-17
Payer: COMMERCIAL

## 2020-11-17 PROCEDURE — 97803 MED NUTRITION INDIV SUBSEQ: CPT

## 2020-11-17 NOTE — PROGRESS NOTES
NUTRITION  FOLLOW-UP TREATMENT NOTE  Patient Name: Buffy Klein         Date: 2020  : 1995    YES/NO Patient  Verified  Diagnosis: Diabetes, Mor Obesity   In time:   945             Out time:   1030   Total Treatment Time (min):   45     SUBJECTIVE/ASSESSMENT    Changes in medication or medical history? Any new allergies, surgeries or procedures? YES/NO    If yes, update Summary List   Buffy Klein is a 30 y.o. female being evaluated by a Virtual Visit (video visit) encounter to address concerns as mentioned above. A caregiver was present when appropriate. Due to this being a TeleHealth encounter (During [facility]-87 public health emergency), evaluation of the following organ systems was limited: Vitals/Constitutional/EENT/Resp/CV/GI//MS/Neuro/Skin/Heme-Lymph-Imm. Pursuant to the emergency declaration under the [address] [place] [address] waiver authority and the [employer] and [employer] Act, this Virtual Visit was conducted with patient's (and/or legal guardian's) consent, to reduce the risk of exposure to COVID-19 and provide  Services were provided through a video synchronous discussion virtually to substitute for in-person encounter. --Otto Carter RD on 2020 at 11:36 AM  An electronic signature was used to authenticate this note. Kaitlin is working hard to put more structure in her sleeping and eating. Has a current goal of going to bed between 11 p.m. and 3 a.m. at the latest with a waking time of 8:30 a.m. at the latest.  Admits to some overeating especially during the stressful times around the election. Has been more mindful of her choices even when going to fast food. Feeling more fearful of leaving her house especially with the increase in [address] cases. Keeping a lot of canned food on hand-concerned about the sodium content.   Follow up with Endocrinology-no A1C drawn and no changes in her Lantus or Trulicity doses.            Current Wt:  Previous Wt:  Wt Change:      Achievement of Goals: -Pt has been about 50% successful with her sleeping goals  -Admits to not meeting her goal of limiting fast food to 2 times per week but does report she is more mindful and orders less food. Patient Education:  [x]  Review current plan with patient   []  Other: Discussed the option of curbside  for more fresh groceries. Discussed looking into her sodium intake for the day and balance out higher sodium choices. Handouts/  Information Provided: []  Carbohydrates  []  Protein  []  Fiber  []  Serving Sizes  []  Fluids  []  General guidelines []  Diabetes  []  Cholesterol  [x]  Sodium  []  SBGM  []  Food Journals  []  Others:      New Patient Goals: -Pt will look into grocery delivery or curbside  before her next visit. PLAN    []  Continue on current plan []  Follow-up PRN   []  Discharge due to :    [x]  Next appt: Tuesday, 12- at 9:30 a.m.      Dietitian: Kelsey Magdaleno MS,RD,CDE    Date: 11/17/2020 Time: 10:49 AM

## 2020-12-15 ENCOUNTER — PATIENT MESSAGE (OUTPATIENT)
Dept: FAMILY MEDICINE CLINIC | Facility: CLINIC | Age: 25
End: 2020-12-15

## 2020-12-15 DIAGNOSIS — E11.65 TYPE 2 DIABETES MELLITUS WITH HYPERGLYCEMIA, WITH LONG-TERM CURRENT USE OF INSULIN (HCC): Primary | ICD-10-CM

## 2020-12-15 DIAGNOSIS — Z79.4 TYPE 2 DIABETES MELLITUS WITH HYPERGLYCEMIA, WITH LONG-TERM CURRENT USE OF INSULIN (HCC): Primary | ICD-10-CM

## 2020-12-15 RX ORDER — ATORVASTATIN CALCIUM 20 MG/1
TABLET, FILM COATED ORAL
Qty: 90 TAB | Refills: 1 | Status: SHIPPED | OUTPATIENT
Start: 2020-12-15 | End: 2022-03-15

## 2021-01-04 ENCOUNTER — TELEPHONE (OUTPATIENT)
Dept: FAMILY MEDICINE CLINIC | Age: 26
End: 2021-01-04

## 2021-01-04 ENCOUNTER — PATIENT MESSAGE (OUTPATIENT)
Dept: FAMILY MEDICINE CLINIC | Age: 26
End: 2021-01-04

## 2021-01-07 ENCOUNTER — HOSPITAL ENCOUNTER (OUTPATIENT)
Dept: NUTRITION | Age: 26
Discharge: HOME | End: 2021-01-07
Payer: COMMERCIAL

## 2021-01-07 PROCEDURE — 97803 MED NUTRITION INDIV SUBSEQ: CPT

## 2021-01-07 NOTE — PROGRESS NOTES
NUTRITION  FOLLOW-UP TREATMENT NOTE  Patient Name: Sanjana Dodge         Date: 2021  : 1995    YES/NO Patient  Verified  Diagnosis: Diabetes, Mor Obesity   In time:   80             Out time:   1015   Total Treatment Time (min):   45     SUBJECTIVE/ASSESSMENT    Changes in medication or medical history? Any new allergies, surgeries or procedures? YES/NO    If yes, update Summary List   Sanjana Dodge is a 30 y.o. female being evaluated by a Virtual Visit (video visit) encounter to address concerns as mentioned above. A caregiver was present when appropriate. Due to this being a TeleHealth encounter (During [facility]-28 public health emergency), evaluation of the following organ systems was limited: Vitals/Constitutional/EENT/Resp/CV/GI//MS/Neuro/Skin/Heme-Lymph-Imm. Pursuant to the emergency declaration under the [address] [place] [address], [address] [place] [address] and the [employer] and [employer] Act, this Virtual Visit was conducted with patient's (and/or legal guardian's) consent, to reduce the risk of exposure to COVID-19 and provide necessary medical care. Services were provided through a video synchronous discussion virtually to substitute for in-person encounter. --Amari Cruz, RD on 2021 at 1:44 PM  An electronic signature was used to authenticate this note. Kaitlin has had a rough couple of months. Her father had Iliana and she stayed quarantined. Doesn't think she contracted the virus but was not tested. Has had a huge problem with insomnia-regardless of when she lays down, has not been able to sleep more than 2-3 hours per night. Has been consistent with her medication but has not done any SBGM. No walking. Admits to some binge eating in the middle of the night when she can't sleep-primarily carbohydrates.   Would like to follow up with her endocrinology Nurse practitioner but has had some insurance issues she needs to resolve. Current Wt:  Previous Wt:  Wt Change:      Achievement of Goals: -Has not looked into curbside  or grocery delivery. Patient Education:  [x]  Review current plan with patient   []  Other:    Handouts/  Information Provided: []  Carbohydrates  []  Protein  []  Fiber  []  Serving Sizes  []  Fluids  []  General guidelines []  Diabetes  []  Cholesterol  []  Sodium  []  SBGM  []  Food Journals  []  Others:      New Patient Goals: -Pt will call her sleep specialist and see if she could have an updated sleep study completed.        PLAN    []  Continue on current plan []  Follow-up PRN   []  Discharge due to :    [x]  Next appt: Tuesday, 2- at 9:30 a.m. (virtual)     Dietitian: Rashawn Qiu MS,RD,CDE    Date: 1/7/2021 Time: 1:43 PM

## 2021-01-28 ENCOUNTER — VIRTUAL VISIT (OUTPATIENT)
Dept: FAMILY MEDICINE CLINIC | Age: 26
End: 2021-01-28
Payer: COMMERCIAL

## 2021-01-28 VITALS — WEIGHT: 293 LBS | HEIGHT: 63 IN | BODY MASS INDEX: 51.91 KG/M2 | RESPIRATION RATE: 12 BRPM

## 2021-01-28 DIAGNOSIS — E11.65 UNCONTROLLED TYPE 2 DIABETES MELLITUS WITH HYPERGLYCEMIA (HCC): Primary | ICD-10-CM

## 2021-01-28 DIAGNOSIS — G47.00 INSOMNIA, UNSPECIFIED TYPE: ICD-10-CM

## 2021-01-28 DIAGNOSIS — F32.1 CURRENT MODERATE EPISODE OF MAJOR DEPRESSIVE DISORDER WITHOUT PRIOR EPISODE (HCC): ICD-10-CM

## 2021-01-28 PROBLEM — F32.A DEPRESSION: Status: RESOLVED | Noted: 2020-02-26 | Resolved: 2021-01-28

## 2021-01-28 PROCEDURE — 99214 OFFICE O/P EST MOD 30 MIN: CPT | Performed by: INTERNAL MEDICINE

## 2021-01-28 RX ORDER — BUPROPION HYDROCHLORIDE 150 MG/1
TABLET ORAL
COMMUNITY
Start: 2021-01-27

## 2021-01-28 RX ORDER — INSULIN GLARGINE 100 [IU]/ML
44 INJECTION, SOLUTION SUBCUTANEOUS
COMMUNITY

## 2021-01-28 RX ORDER — DULAGLUTIDE 0.75 MG/.5ML
0.75 INJECTION, SOLUTION SUBCUTANEOUS
COMMUNITY
Start: 2021-01-25

## 2021-01-28 RX ORDER — TRAZODONE HYDROCHLORIDE 100 MG/1
TABLET ORAL
COMMUNITY
Start: 2021-01-27

## 2021-01-28 RX ORDER — BUSPIRONE HYDROCHLORIDE 30 MG/1
TABLET ORAL
COMMUNITY
Start: 2021-01-27

## 2021-01-28 NOTE — PATIENT INSTRUCTIONS

## 2021-01-28 NOTE — PROGRESS NOTES
Misbah Delvalle is a 27 y.o. female who was seen by synchronous (real-time) audio-video technology on 1/28/2021. Consent:  She and/or her healthcare decision maker is aware that this patient-initiated Telehealth encounter is a billable service, with coverage as determined by her insurance carrier. She is aware that she may receive a bill and has provided verbal consent to proceed: Yes    I was at home while conducting this encounter. Assessment & Plan:   Diagnoses and all orders for this visit:    1. Uncontrolled type 2 diabetes mellitus with hyperglycemia (HCC)  -     MICROALBUMIN, UR, RAND W/ MICROALB/CREAT RATIO; Future  -     REFERRAL TO PHARMACIST    2. Insomnia, unspecified type  -     REFERRAL TO SLEEP STUDIES    3. Current moderate episode of major depressive disorder without prior episode ([facility] [address]    Pt given the phone number for 2 pulmonary doctors  Pt asked to follow up with mental health professsional    Follow-up and Dispositions    · Return in about 3 weeks (around 2/18/2021) for Well woman exam, Go over labs/imaging. Follow-up and Disposition History        Subjective: Misbah Delvalle was seen for Sleep Problem (2mths ), Diabetes, and Depression     Insomnia  This is a chronic problem, new to me. This is not at goal. Pt has a hard time falling asleep. Her sleep is not continuous. She can't sleep more than six hours per night.     Anxiety/Depression/Insomnia/Adult ADD  This is a chronic problem. This is stable. Pt is on fluoxetine, buspirone, bupropion, amphetamine/dextroamphetamine XR and trazodone. Pt has a mental health professional. Pt thinks about harming herself but does not intend to do so.     Diabetes mellitus  This is a chronic problem. BS is not at goal. Pt is on glargine and trulicity.  Pt has an endocrinologist.    3 most recent [facility] 36 Screens 1/28/2021   Little interest or pleasure in doing things More than half the days   Feeling down, depressed, irritable, or hopeless Nearly every day Total Score PHQ 2 5   Trouble falling or staying asleep, or sleeping too much Nearly every day   Feeling tired or having little energy Nearly every day   Poor appetite, weight loss, or overeating Nearly every day   Feeling bad about yourself - or that you are a failure or have let yourself or your family down Nearly every day   Trouble concentrating on things such as school, work, reading, or watching TV Nearly every day   Moving or speaking so slowly that other people could have noticed; or the opposite being so fidgety that others notice Nearly every day   Thoughts of being better off dead, or hurting yourself in some way Several days   PHQ 9 Score 24   How difficult have these problems made it for you to do your work, take care of your home and get along with others Somewhat difficult      Prior to Admission medications    Medication Sig Start Date End Date Taking? Authorizing Provider   insulin glargine (Lantus Solostar U-100 Insulin) 100 unit/mL (3 mL) inpn 44 Units by SubCUTAneous route. Yes Provider, Historical   Trulicity 1.50 YM/1.1 mL sub-q pen 0.75 mg by SubCUTAneous route every seven (7) days. 1/25/21  Yes Provider, Historical   atorvastatin (LIPITOR) 20 mg tablet TAKE 1 TABLET BY MOUTH EVERYDAY AT BEDTIME 12/15/20  Yes Richy Garrison MD   levothyroxine (SYNTHROID) 50 mcg tablet Take 1 Tab by mouth daily. 8/4/20  Yes Angela Scott PA-C   Insulin Needles, Disposable, 31 gauge x 5/16\" ndle 1 Each by SubCUTAneous route daily. 6/2/20  Yes Ralph Scott PA-C   amphetamine-dextroamphetamine XR (ADDERALL XR) 15 mg XR capsule  1/29/20  Yes Provider, Historical   FLUoxetine 60 mg tab  1/31/20  Yes Provider, Historical   melatonin 10 mg tab 200 mg. Yes Provider, Historical   D3-folic acid-collagen,bovine, 800 unit-1 mg- 300 mg cap Take 1 Cap by mouth.    Yes Provider, Historical   magnesium 250 mg tab Take 1 Tab by mouth. 1/31/19  Yes Provider, Historical   traZODone (DESYREL) 100 mg tablet 1/27/21   Provider, Historical   busPIRone (BUSPAR) 30 mg tablet  1/27/21   Provider, Historical   buPROPion XL (WELLBUTRIN XL) 150 mg tablet  1/27/21   Provider, Historical   diphenhydrAMINE (BenadryL) 25 mg capsule Take 1 Cap by mouth every six (6) hours as needed for Itching or Skin Irritation for up to 20 doses. 6/5/20 1/28/21  Doris Beckett MD   Blood-Glucose Meter monitoring kit Use as directed 6/2/20 1/28/21  Shameka Scott PA-C   lancets misc Qid BS testing 6/2/20 1/28/21  Shameka Scott PA-C   glucose blood VI test strips (ASCENSIA AUTODISC VI, ONE TOUCH ULTRA TEST VI) strip Qid BS testing 6/2/20 1/28/21  Shameka Scott PA-C   busPIRone (BUSPAR) 10 mg tablet Take 1 Tab by mouth. 5/1/19 1/28/21  Provider, Historical   traZODone (DESYREL) 50 mg tablet 100 mg. 1/31/20 1/28/21  Provider, Historical     No Known Allergies  ROS  Cardiac: no chest pain or palpitations  Respiratory: no shortness of breath or cough     PHYSICAL EXAMINATION:  [ INSTRUCTIONS:  \"[x]\" Indicates a positive item  \"[]\" Indicates a negative item  -- DELETE ALL ITEMS NOT EXAMINED]  Vital Signs: (As obtained by patient/caregiver at home)  Visit Vitals  Resp 12   Ht 5' 3\" (1.6 m)   Wt 315 lb (142.9 kg)   BMI 55.80 kg/m²        Constitutional: [x] Appears well-developed and well-nourished [x] No apparent distress    Mental status: [x] Alert and awake  [x] Oriented to person/place/time [x] Able to follow commands   Eyes:   Sclera  [x]  Normal   Discharge [x]  None visible    HENT: [x] Normocephalic, atraumatic  Neurological:        [x] No Facial Asymmetry   [x] No gaze palsy  Psychiatric:       [x] Normal Affect []      [x] No Hallucinations     We discussed the expected course, resolution and complications of the diagnosis(es) in detail. Medication risks, benefits, costs, interactions, and alternatives were discussed as indicated.   I advised her to contact the office if her condition worsens, changes or fails to improve as anticipated. She expressed understanding with the diagnosis(es) and plan. Pursuant to the emergency declaration under the [facility] waiver authority and the [employer] Act, this Virtual  Visit was conducted, with patient's consent, to reduce the patient's risk of exposure to COVID-19 and provide continuity of care for an established patient. Services were provided through a video synchronous discussion virtually to substitute for in-person clinic visit.     Juan Ramon Sheppard MD  Internal Medicine  1/28/2021, 10:48 AM  [facility]  [address] [place], [address]  Phone [phone]  Fax [phone]

## 2021-01-29 NOTE — TELEPHONE ENCOUNTER
Pharmacy Progress Note - Telephone Encounter    S/O: Ms. Kaitlin Park 27 y.o. female, referred by Dr. Edwardo Ibrahim MD, was contacted via an outbound telephone call to discuss diabetes education and medication management today. A/P:  -  Left message for patient to call back and schedule a virtual appointment to discuss care plan. Thank you,   Marlene Rivera, PharmD    CLINICAL PHARMACY CONSULT: MED RECONCILIATION/REVIEW ADDENDUM    For Pharmacy Admin Tracking Only    PHSO: PHSO Patient?: No  Total # of Interventions Recommended: Count: 1  Total Interventions Accepted: 1  Time Spent (min): 5

## 2021-02-05 ENCOUNTER — TELEPHONE (OUTPATIENT)
Dept: INTERNAL MEDICINE CLINIC | Age: 26
End: 2021-02-05

## 2021-02-05 NOTE — TELEPHONE ENCOUNTER
Pharmacy Progress Note - Telephone Encounter    S/O: Ms Tereza Dean, 28 yo female,  referred by Dr. Nohelia Grace MD, was contacted via an outbound telephone call today for diabetes management. A/P:  - Patient accepted referral and virtual appointment scheduled for 2/12/2021 at 11:00am.     Thank you,  Marlene Dallas, PharmD  Clinical Pharmacist Specialist    CLINICAL PHARMACY CONSULT: MED RECONCILIATION/REVIEW ADDENDUM    For Pharmacy Admin Tracking Only    PHSO: PHSO Patient?: No  Total # of Interventions Recommended: Count: 1   Total Interventions Accepted: 1  Time Spent (min): 5

## 2021-02-12 ENCOUNTER — VIRTUAL VISIT (OUTPATIENT)
Dept: INTERNAL MEDICINE CLINIC | Age: 26
End: 2021-02-12

## 2021-02-12 DIAGNOSIS — E11.65 TYPE 2 DIABETES MELLITUS WITH HYPERGLYCEMIA, WITH LONG-TERM CURRENT USE OF INSULIN (HCC): Primary | ICD-10-CM

## 2021-02-12 DIAGNOSIS — Z79.4 TYPE 2 DIABETES MELLITUS WITH HYPERGLYCEMIA, WITH LONG-TERM CURRENT USE OF INSULIN (HCC): Primary | ICD-10-CM

## 2021-02-12 NOTE — PROGRESS NOTES
Pharmacy Progress Note - Diabetes Management Initial Visit     S/O: Ms. Preeti Holden is a 27 y.o. female, referred by Dr. Concepcion Escudero MD was seen today for diabetes management. Patient's last A1c was 10.1% on 6/4/2020. HPI:   Ms. Araceli Negron was contacted virtually by telephone this morning. She is feeling well and reports her best blood glucose value recently this morning with fasting at 151. She has changed insurance and is having difficulty seeing Endocrinologist because of network/referral issues. She reports her A1c at visit in August 2020 with Endo was 8.1%. She feels her A1c is higher now because of symptoms and other SMBG values. She takes her medications but admits her diet could be better. Ms. Araceli Negron diabetes is currently managed by her Endocrinologist.  She reports never attempting any oral anti-diabetic therapies. She started on Lantus insulin in Oct 2019 at A1c of 11.3% with evidence of catabolism and polydipsia/polyuria. Trulicity was added in August 2020. She is tolerating both of these therapies. Current anti-hyperglycemic regimen include(s):    -    Lantus 44 units every day  -    Trulicity 3.82EU weekly on Friday       Adherent: yes  Adverse Effects:  no  Cost Barriers:  no    Glycemic Episodes:    - Symptoms of Hyperglycemia: excessive thirst and polyuria     - Symptoms of Hypoglycemia: none  Number of Episodes: 0 - patient denies a history of lows even during insulin initiation/titration and initiation of GLP-1      Self Monitoring Blood Glucose (SMBG) or CGM:     Patient SMBG by fingerstick using ReliOn meter ([employer]). She currently tests about 3 times a week. She reports averages in the 200-250s. Last 30 days:     Highest value - 541     Lowest value - 151  Today Fasting BG - 151      Vitals:   Wt Readings from Last 3 Encounters:   01/27/21 315 lb (142.9 kg)   06/05/20 306 lb (138.8 kg)   02/26/20 315 lb (142.9 kg)     BP Readings from Last 3 Encounters:   06/05/20 (!) 161/99   02/26/20 110/80   03/09/18 (!) 134/95     Pulse Readings from Last 3 Encounters:   06/05/20 (!) 102   02/26/20 93   03/09/18 (!) 101       Past Medical History:   Diagnosis Date    ADHD     Anxiety     Current moderate episode of major depressive disorder without prior episode ([facility] [address] 1/28/2021    Depression     Diabetes (HCC)     Hypercholesterolemia     Insomnia     Thyroid disease      No Known Allergies    Current Outpatient Medications   Medication Sig    insulin glargine (Lantus Solostar U-100 Insulin) 100 unit/mL (3 mL) inpn 44 Units by SubCUTAneous route.  Trulicity 6.45 HJ/8.3 mL sub-q pen 0.75 mg by SubCUTAneous route every seven (7) days.  traZODone (DESYREL) 100 mg tablet     busPIRone (BUSPAR) 30 mg tablet     buPROPion XL (WELLBUTRIN XL) 150 mg tablet     atorvastatin (LIPITOR) 20 mg tablet TAKE 1 TABLET BY MOUTH EVERYDAY AT BEDTIME    levothyroxine (SYNTHROID) 50 mcg tablet Take 1 Tab by mouth daily.  Insulin Needles, Disposable, 31 gauge x 5/16\" ndle 1 Each by SubCUTAneous route daily.  amphetamine-dextroamphetamine XR (ADDERALL XR) 15 mg XR capsule     FLUoxetine 60 mg tab     melatonin 10 mg tab 698 mg.    D3-folic acid-collagen,bovine, 800 unit-1 mg- 300 mg cap Take 1 Cap by mouth.  magnesium 250 mg tab Take 1 Tab by mouth. No current facility-administered medications for this visit. Lab Results   Component Value Date/Time    Sodium 135 (L) 06/05/2020 05:30 PM    Potassium 4.1 06/05/2020 05:30 PM    Chloride 105 06/05/2020 05:30 PM    CO2 26 06/05/2020 05:30 PM    Anion gap 4 06/05/2020 05:30 PM    Glucose 204 (H) 06/05/2020 05:30 PM    BUN 14 06/05/2020 05:30 PM    Creatinine 0.73 06/05/2020 05:30 PM    BUN/Creatinine ratio 19 06/05/2020 05:30 PM    GFR est AA >60 06/05/2020 05:30 PM    GFR est non-AA >60 06/05/2020 05:30 PM    Calcium 8.4 (L) 06/05/2020 05:30 PM    Bilirubin, total 0.6 06/05/2020 05:30 PM    Alk.  phosphatase 79 06/05/2020 05:30 PM    Protein, total 6.7 06/05/2020 05:30 PM    Albumin 3.0 (L) 06/05/2020 05:30 PM    Globulin 3.7 06/05/2020 05:30 PM    A-G Ratio 0.8 06/05/2020 05:30 PM    ALT (SGPT) 19 06/05/2020 05:30 PM       Lab Results   Component Value Date/Time    Cholesterol, total 165 06/04/2020 08:20 AM    HDL Cholesterol 33 (L) 06/04/2020 08:20 AM    LDL, calculated 111.2 (H) 06/04/2020 08:20 AM    VLDL, calculated 20.8 06/04/2020 08:20 AM    Triglyceride 104 06/04/2020 08:20 AM    CHOL/HDL Ratio 5.0 06/04/2020 08:20 AM       Lab Results   Component Value Date/Time    WBC 5.8 06/05/2020 05:30 PM    HGB 12.9 06/05/2020 05:30 PM    HCT 40.4 06/05/2020 05:30 PM    PLATELET 806 82/79/3790 05:30 PM    MCV 80.2 06/05/2020 05:30 PM       Lab Results   Component Value Date/Time    Microalbumin/Creat ratio (mg/g creat) 52 (H) 02/28/2020 11:18 AM    Microalbumin,urine random 6.63 (H) 02/28/2020 11:18 AM       HbA1c:  Lab Results   Component Value Date/Time    Hemoglobin A1c 10.1 (H) 06/04/2020 08:20 AM    Hemoglobin A1c, External 11.3 10/10/2019           Diabetes Health Maintenance:  Last UACR: 52 on 2/28/2020  Last Eye exam: ---  Last Foot exam: ---  Last Hearing exam: ---  Last Influenza vaccine: --   Last Pneumovax 23 vaccine: ordered in 2020, need to verify if given. Last Prevnar-13 vaccine: ---   Hepatitis B Series:  ---   Shingrix: not indicated  HPV (if indicated) :yes  ASA Therapy: no  ACE/ARB Therapy: no  Statin Therapy: yes        A/P:    Diabetes Management:    - Per ADA guidelines, Pt's A1c is not at goal of < 7%. - Patient has pending lab orders from Dr. Jono Armstrong, patient is having transportation issues but plans to get lab draw (A1c, UACR, lipid)    - Education provided about A1c and SMBG values, especially for guiding treatment decisions and adjusting dosages for medications.    - Recommend patient begin SMBG regularly 1 to 2 times a day, fasting and post-meal or bedtime.   Patient has supplies to test and understands how to use her machine. Education was given around goals. Fasting blood sugar goal ; post-meal/bedtime <180    - Patient was given education about MOA for Lantus and Trulicity. Advantages and side effects discussed for each medication if dosages were adjusted to meet A1c goal.       Patient Assistance:  - Patient to contact the Diabetic Team [phone]. Patient is a dependent of [facility] employee and encourage to enroll in this program for rewards and assistance in navigating Endocrinologist issue. Patient will follow up virtually in 3 weeks to discuss SMBG values and Endocrinology medication management relationship. Medication reconciliation was completed during the visit. There are no discontinued medications. Patient verbalized understanding of the information presented and all of the patients questions were answered. Patient advised to use [employer] to contact me with additional questions or concerns. I was added to her patient care team.    Notifications of recommendations will be sent to Dr. Pranay Mosqueda MD for review. Patient will follow up in 3 week(s). Thank you for the consult,    Marlene Mathias, PharmD  Clinical Pharmacist Specialist    CLINICAL PHARMACY CONSULT: MED RECONCILIATION/REVIEW ADDENDUM    For Pharmacy Admin Tracking Only    PHSO: PHSO Patient?: No  Total # of Interventions Recommended: Count: 2  Total Interventions Accepted: 2  Time Spent (min): 60

## 2021-02-12 NOTE — PATIENT INSTRUCTIONS
- Contact [facility] Diabetic Team 23-14-20-09   to assist in navigating insurance issues with Endocrinology and enroll in reward program 
 
- Begin checking blood sugar 1 to 2 times daily, fasting and post-meal or bedtime, to see level of diabetes control - Will follow up in 3 weeks to check on insurance issue and evaluate blood glucose log.

## 2021-02-16 ENCOUNTER — HOSPITAL ENCOUNTER (OUTPATIENT)
Dept: NUTRITION | Age: 26
Discharge: HOME | End: 2021-02-16
Payer: COMMERCIAL

## 2021-02-16 ENCOUNTER — DOCUMENTATION ONLY (OUTPATIENT)
Dept: PULMONOLOGY | Age: 26
End: 2021-02-16

## 2021-02-16 PROCEDURE — 97803 MED NUTRITION INDIV SUBSEQ: CPT

## 2021-02-16 NOTE — PROGRESS NOTES
NUTRITION  FOLLOW-UP TREATMENT NOTE  Patient Name: Albino Kaufman         Date: 2021  : 1995    YES/NO Patient  Verified  Diagnosis: Diabetes, Mor Obesity   In time:   80             Out time:   1015   Total Treatment Time (min):   45     SUBJECTIVE/ASSESSMENT    Changes in medication or medical history? Any new allergies, surgeries or procedures? YES/NO    If yes, update Summary List   Albino Kaufman is a 27 y.o. female being evaluated by a Virtual Visit (video visit) encounter to address concerns as mentioned above. A caregiver was present when appropriate. Due to this being a TeleHealth encounter (During VQHGB-59 public health emergency), evaluation of the following organ systems was limited: Vitals/Constitutional/EENT/Resp/CV/GI//MS/Neuro/Skin/Heme-Lymph-Imm. Pursuant to the emergency declaration under the [address] [place] [address], [address] [place] [address] and the [employer] and [employer] Act, this Virtual Visit was conducted with patient's (and/or legal guardian's) consent, to reduce the risk of exposure to COVID-19 and provide necessary medical care. Services were provided through a video synchronous discussion virtually to substitute for in-person encounter. Both patient and provider were located in the [facility] during the virtual session. --Yoandy Little RD on 2021 at 12:13 PM  An electronic signature was used to authenticate this note. Pt's sleeping has improved a bit-sleeping about every other night. Has consulted with a [facility] Pharmacist who made some suggestions that she will discuss with her PCP or endo. Pt's insurance may change. Pt has reached out to a sleep specialist but again may have some insurance issues. Pt reacts to certain situations (I.e. family refrigerator and freezer broke) by not eating for many hours and then binge eating.   This has resulted in a high (over 300) fasting blood sugar but before this most have been under 200. Has stopped her regular walking. Current Wt:  Previous Wt:  Wt Change:      Achievement of Goals: -Has started the process of following up with sleep specialist and will continue once insurance issues are resolved. Patient Education:  [x]  Review current plan with patient   []  Other:    Handouts/  Information Provided: []  Carbohydrates  []  Protein  []  Fiber  []  Serving Sizes  []  Fluids  []  General guidelines []  Diabetes  []  Cholesterol  []  Sodium  []  SBGM  []  Food Journals  []  Others:      New Patient Goals: -Pt will look at her schedule one week in advance and \"pre-plan for the week based on schedule, weather, etc.       PLAN    []  Continue on current plan []  Follow-up PRN   []  Discharge due to :    [x]  Next appt: Tuesday, 4- at 9:30 a.m.      Dietitian: Rashawn Qiu MS,RD,CDE    Date: 2/16/2021 Time: 9:21 AM

## 2021-03-12 ENCOUNTER — TELEPHONE (OUTPATIENT)
Dept: INTERNAL MEDICINE CLINIC | Age: 26
End: 2021-03-12

## 2021-03-12 ENCOUNTER — PATIENT MESSAGE (OUTPATIENT)
Dept: FAMILY MEDICINE CLINIC | Age: 26
End: 2021-03-12

## 2021-03-12 NOTE — TELEPHONE ENCOUNTER
Pharmacy Progress Note - Telephone Encounter    S/O: Ms. Kaitlin Gandara 27 y.o. female, referred by Dr. Fritz Roblero MD, was contacted for follow up on diabetes management. A/P:  - Left message for patient to call back Travis Sanchez to schedule follow up appointment. Thank you,  Marlene Willett, PharmD  Clinical Pharmacist Specialist      CLINICAL PHARMACY CONSULT: MED RECONCILIATION/REVIEW ADDENDUM    For Pharmacy Admin Tracking Only    PHSO: PHSO Patient?: No  Total # of Interventions Recommended: Count: 1   Total Interventions Accepted: 1  Time Spent (min): 5

## 2021-05-11 ENCOUNTER — HOSPITAL ENCOUNTER (OUTPATIENT)
Dept: NUTRITION | Age: 26
End: 2021-05-11

## 2022-01-21 ENCOUNTER — VIRTUAL VISIT (OUTPATIENT)
Dept: FAMILY MEDICINE CLINIC | Age: 27
End: 2022-01-21

## 2022-01-21 DIAGNOSIS — E03.9 ACQUIRED HYPOTHYROIDISM: ICD-10-CM

## 2022-01-21 DIAGNOSIS — R53.81 MALAISE: ICD-10-CM

## 2022-01-21 DIAGNOSIS — E11.65 TYPE 2 DIABETES MELLITUS WITH HYPERGLYCEMIA, WITH LONG-TERM CURRENT USE OF INSULIN (HCC): Primary | ICD-10-CM

## 2022-01-21 DIAGNOSIS — Z86.2 HX OF IRON DEFICIENCY ANEMIA: ICD-10-CM

## 2022-01-21 DIAGNOSIS — N92.1 MENORRHAGIA WITH IRREGULAR CYCLE: ICD-10-CM

## 2022-01-21 DIAGNOSIS — Z79.4 TYPE 2 DIABETES MELLITUS WITH HYPERGLYCEMIA, WITH LONG-TERM CURRENT USE OF INSULIN (HCC): Primary | ICD-10-CM

## 2022-01-21 DIAGNOSIS — F32.1 CURRENT MODERATE EPISODE OF MAJOR DEPRESSIVE DISORDER WITHOUT PRIOR EPISODE (HCC): ICD-10-CM

## 2022-01-21 PROCEDURE — 99214 OFFICE O/P EST MOD 30 MIN: CPT | Performed by: PHYSICIAN ASSISTANT

## 2022-01-21 NOTE — PATIENT INSTRUCTIONS
Anemia: Care Instructions  Your Care Instructions     Anemia is a low level of red blood cells, which carry oxygen throughout your body. Many things can cause anemia. Lack of iron is one of the most common causes. Your body needs iron to make hemoglobin, a substance in red blood cells that carries oxygen from the lungs to your body's cells. Without enough iron, the body produces fewer and smaller red blood cells. As a result, your body's cells do not get enough oxygen, and you feel tired and weak. And you may have trouble concentrating. Bleeding is the most common cause of a lack of iron. You may have heavy menstrual bleeding or bleeding caused by conditions such as ulcers, hemorrhoids, or cancer. Regular use of aspirin or other anti-inflammatory medicines (such as ibuprofen) also can cause bleeding in some people. A lack of iron in your diet also can cause anemia, especially at times when the body needs more iron, such as during pregnancy, infancy, and the teen years. Your doctor may have prescribed iron pills. It may take several months of treatment for your iron levels to return to normal. Your doctor also may suggest that you eat foods that are rich in iron, such as meat and beans. There are many other causes of anemia. It is not always due to a lack of iron. Finding the specific cause of your anemia will help your doctor find the right treatment for you. Follow-up care is a key part of your treatment and safety. Be sure to make and go to all appointments, and call your doctor if you are having problems. It's also a good idea to know your test results and keep a list of the medicines you take. How can you care for yourself at home? · Take your medicines exactly as prescribed. Call your doctor if you think you are having a problem with your medicine. · If your doctor recommends iron pills, take them as directed:  ? Try to take the pills on an empty stomach about 1 hour before or 2 hours after meals. But you may need to take iron with food to avoid an upset stomach. ? Do not take antacids or drink milk or caffeine drinks (such as coffee, tea, or cola) at the same time or within 2 hours of the time that you take your iron. They can make it hard for your body to absorb the iron. ? Vitamin C (from food or supplements) helps your body absorb iron. Try taking iron pills with a glass of orange juice or some other food that is high in vitamin C, such as citrus fruits. ? Iron pills may cause stomach problems, such as heartburn, nausea, diarrhea, constipation, and cramps. Be sure to drink plenty of fluids, and include fruits, vegetables, and fiber in your diet each day. Iron pills often make your bowel movements dark or green. ? If you forget to take an iron pill, do not take a double dose of iron the next time you take a pill. ? Keep iron pills out of the reach of small children. An overdose of iron can be very dangerous. · Follow your doctor's advice about eating iron-rich foods. These include red meat, shellfish, poultry, eggs, beans, raisins, whole-grain bread, and leafy green vegetables. · Steam vegetables to help them keep their iron content. When should you call for help? Call 911 anytime you think you may need emergency care. For example, call if:    · You have symptoms of a heart attack. These may include:  ? Chest pain or pressure, or a strange feeling in the chest.  ? Sweating. ? Shortness of breath. ? Nausea or vomiting. ? Pain, pressure, or a strange feeling in the back, neck, jaw, or upper belly or in one or both shoulders or arms. ? Lightheadedness or sudden weakness. ? A fast or irregular heartbeat. After you call 911, the  may tell you to chew 1 adult-strength or 2 to 4 low-dose aspirin. Wait for an ambulance. Do not try to drive yourself.     · You passed out (lost consciousness).    Call your doctor now or seek immediate medical care if:    · You have new or increased shortness of breath.     · You are dizzy or lightheaded, or you feel like you may faint.     · Your fatigue and weakness continue or get worse.     · You have any abnormal bleeding, such as:  ? Nosebleeds. ? Vaginal bleeding that is different (heavier, more frequent, at a different time of the month) than what you are used to.  ? Bloody or black stools, or rectal bleeding. ? Bloody or pink urine. Watch closely for changes in your health, and be sure to contact your doctor if:    · You do not get better as expected. Where can you learn more? Go to http://www.blanchard.com/  Enter R301 in the search box to learn more about \"Anemia: Care Instructions. \"  Current as of: April 29, 2021               Content Version: 13.0  © 8720-7153 [employer]. Care instructions adapted under license by [employer] (which disclaims liability or warranty for this information). If you have questions about a medical condition or this instruction, always ask your healthcare professional. [place] [address] any warranty or liability for your use of this information.

## 2022-01-21 NOTE — PROGRESS NOTES
HISTORY OF PRESENT ILLNESS  Kaitlin Hurd is a 32 y.o. female. HPI  Pt is being seen via doxy. me for an updated referral to endo for her diabetes. She has been excessively tired and weak the past 2-3 weeks and has had a decreased appetite. She has also been on her menstrual cycle for the past 2 months and bleeding heavily. She has been taking 2 - 65mg iron pills daily but only for the past 4days. She has a hx of anemia and irreg menstrual bleeding and will schedule an appt for f/u with her gyn. WIll have her come in for labs next week and check her iron, thyroid, and diabetes. Will get fructosamine instead of A1c incase her hgb is low from anemia. No Known Allergies    Current Outpatient Medications   Medication Sig    insulin glargine (Lantus Solostar U-100 Insulin) 100 unit/mL (3 mL) inpn 44 Units by SubCUTAneous route.  Trulicity 9.91 YL/7.3 mL sub-q pen 0.75 mg by SubCUTAneous route every seven (7) days.  traZODone (DESYREL) 100 mg tablet     busPIRone (BUSPAR) 30 mg tablet     buPROPion XL (WELLBUTRIN XL) 150 mg tablet     atorvastatin (LIPITOR) 20 mg tablet TAKE 1 TABLET BY MOUTH EVERYDAY AT BEDTIME    levothyroxine (SYNTHROID) 50 mcg tablet Take 1 Tab by mouth daily.  Insulin Needles, Disposable, 31 gauge x 5/16\" ndle 1 Each by SubCUTAneous route daily.  amphetamine-dextroamphetamine XR (ADDERALL XR) 15 mg XR capsule     FLUoxetine 60 mg tab     melatonin 10 mg tab 106 mg.    D3-folic acid-collagen,bovine, 800 unit-1 mg- 300 mg cap Take 1 Cap by mouth.  magnesium 250 mg tab Take 1 Tab by mouth. No current facility-administered medications for this visit. Review of Systems   Constitutional: Positive for malaise/fatigue. Negative for chills and fever. HENT: Negative. Negative for congestion, ear pain, sore throat and tinnitus. Eyes: Negative. Negative for blurred vision, double vision and photophobia. Respiratory: Negative.   Negative for cough, shortness of breath and wheezing. Cardiovascular: Negative. Negative for chest pain, palpitations and leg swelling. Gastrointestinal: Negative. Negative for abdominal pain, heartburn, nausea and vomiting. Genitourinary: Negative for dysuria, frequency, hematuria and urgency. Heavy menses x 2mo   Musculoskeletal: Negative. Negative for back pain, joint pain, myalgias and neck pain. Skin: Negative. Negative for itching and rash. Neurological: Negative. Negative for dizziness, tingling, tremors and headaches. Psychiatric/Behavioral: Negative. Negative for depression and memory loss. The patient is not nervous/anxious and does not have insomnia. Physical Exam  Constitutional:       General: She is in acute distress. Appearance: Normal appearance. She is not ill-appearing. HENT:      Head: Normocephalic and atraumatic. Pulmonary:      Effort: No respiratory distress. Neurological:      Mental Status: She is alert and oriented to person, place, and time. Psychiatric:         Mood and Affect: Mood normal.         Behavior: Behavior normal.         Thought Content: Thought content normal.         Judgment: Judgment normal.         ASSESSMENT and PLAN    ICD-10-CM ICD-9-CM    1. Type 2 diabetes mellitus with hyperglycemia, with long-term current use of insulin ([facility])  E11.65 250.00 REFERRAL TO ENDOCRINOLOGY    Z79.4 790.29 FRUCTOSAMINE     B72.23 METABOLIC PANEL, COMPREHENSIVE   2. Current moderate episode of major depressive disorder without prior episode ([facility])  F32.1 296.22    3. Malaise  R53.81 780.79 CBC WITH AUTOMATED DIFF      IRON PROFILE      LIPID PANEL      FERRITIN   4. Hx of iron deficiency anemia  Z86.2 V12.3 CBC WITH AUTOMATED DIFF      IRON PROFILE      LIPID PANEL      FERRITIN   5. Acquired hypothyroidism  E03.9 244.9 TSH 3RD GENERATION      T4, FREE   6.  Menorrhagia with irregular cycle  N92.1 626.2      Follow-up and Dispositions    · Return in about 1 week (around 1/28/2022) for follow up labs. Pt expressed understanding of visit summary and plans for any follow ups or referrals as well as any medications prescribed. Seen by synchronous (real-time) audio-video technology via doxy. me on 1/21/2022    The patient is aware that this patient-initiated Telehealth encounter is a billable service, with coverage as determined by his or her insurance carrier. He/She is aware that they may receive a bill and has provided verbal consent to proceed: Yes        I was in the office while conducting this encounter.

## 2022-02-10 ENCOUNTER — HOSPITAL ENCOUNTER (OUTPATIENT)
Dept: LAB | Age: 27
Discharge: HOME | End: 2022-02-10

## 2022-02-10 ENCOUNTER — OFFICE VISIT (OUTPATIENT)
Dept: FAMILY MEDICINE CLINIC | Age: 27
End: 2022-02-10
Payer: COMMERCIAL

## 2022-02-10 VITALS
BODY MASS INDEX: 51.91 KG/M2 | DIASTOLIC BLOOD PRESSURE: 99 MMHG | WEIGHT: 293 LBS | HEIGHT: 63 IN | RESPIRATION RATE: 16 BRPM | HEART RATE: 130 BPM | SYSTOLIC BLOOD PRESSURE: 139 MMHG | TEMPERATURE: 97.9 F | OXYGEN SATURATION: 97 %

## 2022-02-10 DIAGNOSIS — T78.40XS ALLERGIC REACTION TO DRUG, SEQUELA: ICD-10-CM

## 2022-02-10 DIAGNOSIS — E03.9 ACQUIRED HYPOTHYROIDISM: ICD-10-CM

## 2022-02-10 DIAGNOSIS — E11.65 TYPE 2 DIABETES MELLITUS WITH HYPERGLYCEMIA, WITH LONG-TERM CURRENT USE OF INSULIN (HCC): ICD-10-CM

## 2022-02-10 DIAGNOSIS — Z79.4 TYPE 2 DIABETES MELLITUS WITH HYPERGLYCEMIA, WITH LONG-TERM CURRENT USE OF INSULIN (HCC): ICD-10-CM

## 2022-02-10 DIAGNOSIS — R53.81 MALAISE: ICD-10-CM

## 2022-02-10 DIAGNOSIS — E66.01 MORBID OBESITY WITH BMI OF 50.0-59.9, ADULT (HCC): ICD-10-CM

## 2022-02-10 DIAGNOSIS — Z86.2 HX OF IRON DEFICIENCY ANEMIA: ICD-10-CM

## 2022-02-10 DIAGNOSIS — L02.411 ABSCESS OF RIGHT AXILLA: Primary | ICD-10-CM

## 2022-02-10 LAB
ALBUMIN SERPL-MCNC: 3.6 G/DL (ref 3.4–5)
ALBUMIN/GLOB SERPL: 1.1 {RATIO} (ref 0.8–1.7)
ALP SERPL-CCNC: 96 U/L (ref 45–117)
ALT SERPL-CCNC: 31 U/L (ref 13–56)
ANION GAP SERPL CALC-SCNC: 8 MMOL/L (ref 3–18)
AST SERPL-CCNC: 8 U/L (ref 10–38)
BASOPHILS # BLD: 0 K/UL (ref 0–0.1)
BASOPHILS NFR BLD: 0 % (ref 0–2)
BILIRUB SERPL-MCNC: 0.4 MG/DL (ref 0.2–1)
BUN SERPL-MCNC: 14 MG/DL (ref 7–18)
BUN/CREAT SERPL: 16 (ref 12–20)
CALCIUM SERPL-MCNC: 9.5 MG/DL (ref 8.5–10.1)
CHLORIDE SERPL-SCNC: 101 MMOL/L (ref 100–111)
CHOLEST SERPL-MCNC: 252 MG/DL
CO2 SERPL-SCNC: 25 MMOL/L (ref 21–32)
CREAT SERPL-MCNC: 0.87 MG/DL (ref 0.6–1.3)
DIFFERENTIAL METHOD BLD: ABNORMAL
EOSINOPHIL # BLD: 0.1 K/UL (ref 0–0.4)
EOSINOPHIL NFR BLD: 1 % (ref 0–5)
ERYTHROCYTE [DISTWIDTH] IN BLOOD BY AUTOMATED COUNT: 13.6 % (ref 11.6–14.5)
FERRITIN SERPL-MCNC: 67 NG/ML (ref 8–388)
GLOBULIN SER CALC-MCNC: 3.3 G/DL (ref 2–4)
GLUCOSE SERPL-MCNC: 331 MG/DL (ref 74–99)
HCT VFR BLD AUTO: 43.3 % (ref 35–45)
HDLC SERPL-MCNC: 35 MG/DL (ref 40–60)
HDLC SERPL: 7.2 {RATIO} (ref 0–5)
HGB BLD-MCNC: 13.2 G/DL (ref 12–16)
IMM GRANULOCYTES # BLD AUTO: 0 K/UL (ref 0–0.04)
IMM GRANULOCYTES NFR BLD AUTO: 0 % (ref 0–0.5)
IRON SATN MFR SERPL: 10 % (ref 20–50)
IRON SERPL-MCNC: 37 UG/DL (ref 50–175)
LDLC SERPL CALC-MCNC: 194.4 MG/DL (ref 0–100)
LIPID PROFILE,FLP: ABNORMAL
LYMPHOCYTES # BLD: 2.6 K/UL (ref 0.9–3.6)
LYMPHOCYTES NFR BLD: 23 % (ref 21–52)
MCH RBC QN AUTO: 26.4 PG (ref 24–34)
MCHC RBC AUTO-ENTMCNC: 30.5 G/DL (ref 31–37)
MCV RBC AUTO: 86.6 FL (ref 78–100)
MONOCYTES # BLD: 0.8 K/UL (ref 0.05–1.2)
MONOCYTES NFR BLD: 7 % (ref 3–10)
NEUTS SEG # BLD: 7.5 K/UL (ref 1.8–8)
NEUTS SEG NFR BLD: 68 % (ref 40–73)
NRBC # BLD: 0 K/UL (ref 0–0.01)
NRBC BLD-RTO: 0 PER 100 WBC
PLATELET # BLD AUTO: 371 K/UL (ref 135–420)
PMV BLD AUTO: 11.9 FL (ref 9.2–11.8)
POTASSIUM SERPL-SCNC: 4.7 MMOL/L (ref 3.5–5.5)
PROT SERPL-MCNC: 6.9 G/DL (ref 6.4–8.2)
RBC # BLD AUTO: 5 M/UL (ref 4.2–5.3)
SODIUM SERPL-SCNC: 134 MMOL/L (ref 136–145)
T4 FREE SERPL-MCNC: 1.4 NG/DL (ref 0.7–1.5)
TIBC SERPL-MCNC: 365 UG/DL (ref 250–450)
TRIGL SERPL-MCNC: 113 MG/DL (ref ?–150)
TSH SERPL DL<=0.05 MIU/L-ACNC: 3.52 UIU/ML (ref 0.36–3.74)
VLDLC SERPL CALC-MCNC: 22.6 MG/DL
WBC # BLD AUTO: 11 K/UL (ref 4.6–13.2)

## 2022-02-10 PROCEDURE — 84439 ASSAY OF FREE THYROXINE: CPT

## 2022-02-10 PROCEDURE — 99214 OFFICE O/P EST MOD 30 MIN: CPT | Performed by: PHYSICIAN ASSISTANT

## 2022-02-10 PROCEDURE — 82985 ASSAY OF GLYCATED PROTEIN: CPT

## 2022-02-10 PROCEDURE — 80061 LIPID PANEL: CPT

## 2022-02-10 PROCEDURE — 84443 ASSAY THYROID STIM HORMONE: CPT

## 2022-02-10 PROCEDURE — 83540 ASSAY OF IRON: CPT

## 2022-02-10 PROCEDURE — 85025 COMPLETE CBC W/AUTO DIFF WBC: CPT

## 2022-02-10 PROCEDURE — 82728 ASSAY OF FERRITIN: CPT

## 2022-02-10 PROCEDURE — 80053 COMPREHEN METABOLIC PANEL: CPT

## 2022-02-10 RX ORDER — CIPROFLOXACIN 500 MG/1
500 TABLET ORAL 2 TIMES DAILY
Qty: 20 TABLET | Refills: 0 | Status: SHIPPED | OUTPATIENT
Start: 2022-02-10 | End: 2022-02-20

## 2022-02-10 RX ORDER — EPINEPHRINE 0.3 MG/.3ML
0.3 INJECTION SUBCUTANEOUS
Qty: 2 EACH | Refills: 1 | Status: SHIPPED | OUTPATIENT
Start: 2022-02-10 | End: 2022-02-10

## 2022-02-10 RX ORDER — DICLOXACILLIN SODIUM 250 MG/1
500 CAPSULE ORAL
Qty: 40 CAPSULE | Refills: 0 | Status: SHIPPED | OUTPATIENT
Start: 2022-02-10

## 2022-02-10 NOTE — PATIENT INSTRUCTIONS
Skin Abscess: Care Instructions  Your Care Instructions     A skin abscess is a bacterial infection that forms a pocket of pus. A boil is a kind of skin abscess. The doctor may have cut an opening in the abscess so that the pus can drain out. You may have gauze in the cut so that the abscess will stay open and keep draining. You may need antibiotics. You will need to follow up with your doctor to make sure the infection has gone away. The doctor has checked you carefully, but problems can develop later. If you notice any problems or new symptoms, get medical treatment right away. Follow-up care is a key part of your treatment and safety. Be sure to make and go to all appointments, and call your doctor if you are having problems. It's also a good idea to know your test results and keep a list of the medicines you take. How can you care for yourself at home? · Apply warm and dry compresses, a heating pad set on low, or a hot water bottle 3 or 4 times a day for pain. Keep a cloth between the heat source and your skin. · If your doctor prescribed antibiotics, take them as directed. Do not stop taking them just because you feel better. You need to take the full course of antibiotics. · Take pain medicines exactly as directed. ? If the doctor gave you a prescription medicine for pain, take it as prescribed. ? If you are not taking a prescription pain medicine, ask your doctor if you can take an over-the-counter medicine. · Keep your bandage clean and dry. Change the bandage whenever it gets wet or dirty, or at least one time a day. · If the abscess was packed with gauze:  ? Keep follow-up appointments to have the gauze changed or removed. If the doctor instructed you to remove the gauze, follow the instructions you were given for how to remove it. ? After the gauze is removed, soak the area in warm water for 15 to 20 minutes 2 times a day, until the wound closes. When should you call for help?    Call your doctor now or seek immediate medical care if:    · You have signs of worsening infection, such as:  ? Increased pain, swelling, warmth, or redness. ? Red streaks leading from the infected skin. ? Pus draining from the wound. ? A fever. Watch closely for changes in your health, and be sure to contact your doctor if:    · You do not get better as expected. Where can you learn more? Go to http://www.gray.com/  Enter L319 in the search box to learn more about \"Skin Abscess: Care Instructions. \"  Current as of: March 3, 2021               Content Version: 13.0  © 2727-3897 [employer]. Care instructions adapted under license by [employer] (which disclaims liability or warranty for this information). If you have questions about a medical condition or this instruction, always ask your healthcare professional. [address] any warranty or liability for your use of this information.

## 2022-02-10 NOTE — PROGRESS NOTES
HISTORY OF PRESENT ILLNESS  Kaitlin Littlejohn is a 32 y.o. female. HPI   Pt is being seen for an infection that started under her right arm about 2 weeks ago and has been getting larger. Denies fever, chills, N/V, CP, palpitations, itching, and drainage. She had something similar under her left arm 2 yrs ago and was given bactrim and keflex but had a reaction (SOB and rash) so stopped abx and it went away eventually. She is a diabetic and her last A1c was >10. She sees endo but does not monitor her BS and has trouble with binge eating when she is depressed. She has tried to make small changes such as she has increased her water intake. Allergies   Allergen Reactions    Bactrim [Sulfamethoprim] Shortness of Breath and Rash    Keflex [Cephalexin] Shortness of Breath and Rash       Current Outpatient Medications   Medication Sig    ciprofloxacin HCl (CIPRO) 500 mg tablet Take 1 Tablet by mouth two (2) times a day for 10 days.  dicloxacillin (DYNAPEN) 250 mg capsule Take 2 Capsules by mouth Before breakfast, lunch, dinner and at bedtime.  EPINEPHrine (EPIPEN) 0.3 mg/0.3 mL injection 0.3 mL by IntraMUSCular route once as needed for Allergic Response for up to 1 dose.  insulin glargine (Lantus Solostar U-100 Insulin) 100 unit/mL (3 mL) inpn 44 Units by SubCUTAneous route.  Trulicity 0.73 AX/6.0 mL sub-q pen 0.75 mg by SubCUTAneous route every seven (7) days.  traZODone (DESYREL) 100 mg tablet     busPIRone (BUSPAR) 30 mg tablet     buPROPion XL (WELLBUTRIN XL) 150 mg tablet     atorvastatin (LIPITOR) 20 mg tablet TAKE 1 TABLET BY MOUTH EVERYDAY AT BEDTIME    levothyroxine (SYNTHROID) 50 mcg tablet Take 1 Tab by mouth daily.  Insulin Needles, Disposable, 31 gauge x 5/16\" ndle 1 Each by SubCUTAneous route daily.  amphetamine-dextroamphetamine XR (ADDERALL XR) 15 mg XR capsule     FLUoxetine 60 mg tab     D3-folic acid-collagen,bovine, 800 unit-1 mg- 300 mg cap Take 1 Cap by mouth.     magnesium 250 mg tab Take 1 Tab by mouth.  melatonin 10 mg tab 200 mg. (Patient not taking: Reported on 2/10/2022)     No current facility-administered medications for this visit. Review of Systems   Constitutional: Negative. Negative for chills, fever and malaise/fatigue. HENT: Negative. Negative for congestion, ear pain, sore throat and tinnitus. Eyes: Negative. Negative for blurred vision, double vision and photophobia. Respiratory: Negative. Negative for cough, shortness of breath and wheezing. Cardiovascular: Negative. Negative for chest pain, palpitations and leg swelling. Gastrointestinal: Negative. Negative for abdominal pain, heartburn, nausea and vomiting. Genitourinary: Negative. Negative for dysuria, frequency, hematuria and urgency. Musculoskeletal: Negative. Negative for back pain, joint pain, myalgias and neck pain. Skin: Negative for itching and rash. Infection under right arm   Neurological: Negative. Negative for dizziness, tingling, tremors and headaches. Psychiatric/Behavioral: Positive for depression (controlled on meds). Negative for memory loss. The patient is nervous/anxious (controlled on meds). The patient does not have insomnia. Visit Vitals  BP (!) 139/99 (BP 1 Location: Left upper arm, BP Patient Position: Sitting)   Pulse (!) 130   Temp 97.9 °F (36.6 °C) (Temporal)   Resp 16   Ht 5' 3\" (1.6 m)   Wt 316 lb (143.3 kg)   SpO2 97%   BMI 55.98 kg/m²       Physical Exam  Constitutional:       General: She is not in acute distress. Appearance: Normal appearance. She is obese. She is not ill-appearing. HENT:      Head: Normocephalic and atraumatic. Cardiovascular:      Rate and Rhythm: Regular rhythm. Tachycardia present. Pulses: Normal pulses. Heart sounds: Normal heart sounds. Pulmonary:      Effort: Pulmonary effort is normal.      Breath sounds: Normal breath sounds. Skin:     Findings: Abscess (rt axilla) present.       Comments: Abscess rt axillary region w tenderness, warmth, and erythema. No induration, fluctuance or drainage. Neurological:      Mental Status: She is alert and oriented to person, place, and time. Psychiatric:         Mood and Affect: Mood normal.         Behavior: Behavior normal.         Thought Content: Thought content normal.         Judgment: Judgment normal.         ASSESSMENT and PLAN    ICD-10-CM ICD-9-CM    1. Abscess of right axilla  L02.411 682.3 ciprofloxacin HCl (CIPRO) 500 mg tablet      dicloxacillin (DYNAPEN) 250 mg capsule   2. Morbid obesity with BMI of 50.0-59.9, adult ([facility])  E66.01 278.01     Z68.43 V85.43    3. Allergic reaction to drug, sequela  T78.40XS 909.9 EPINEPHrine (EPIPEN) 0.3 mg/0.3 mL injection     Follow-up and Dispositions    · Return in about 3 months (around 5/10/2022) for follow up, diabetes. Pt expressed understanding of visit summary and plans for any follow ups or referrals as well as any medications prescribed.

## 2022-02-12 LAB — FRUCTOSAMINE SERPL-SCNC: 358 UMOL/L (ref 0–285)

## 2022-03-15 ENCOUNTER — OFFICE VISIT (OUTPATIENT)
Dept: FAMILY MEDICINE CLINIC | Age: 27
End: 2022-03-15
Payer: COMMERCIAL

## 2022-03-15 VITALS
RESPIRATION RATE: 17 BRPM | BODY MASS INDEX: 51.91 KG/M2 | DIASTOLIC BLOOD PRESSURE: 88 MMHG | WEIGHT: 293 LBS | HEART RATE: 102 BPM | SYSTOLIC BLOOD PRESSURE: 130 MMHG | TEMPERATURE: 99 F | OXYGEN SATURATION: 97 % | HEIGHT: 63 IN

## 2022-03-15 DIAGNOSIS — E78.00 HYPERCHOLESTEROLEMIA: ICD-10-CM

## 2022-03-15 DIAGNOSIS — Z79.4 TYPE 2 DIABETES MELLITUS WITH HYPERGLYCEMIA, WITH LONG-TERM CURRENT USE OF INSULIN (HCC): Primary | ICD-10-CM

## 2022-03-15 DIAGNOSIS — E11.65 TYPE 2 DIABETES MELLITUS WITH HYPERGLYCEMIA, WITH LONG-TERM CURRENT USE OF INSULIN (HCC): Primary | ICD-10-CM

## 2022-03-15 DIAGNOSIS — E66.01 MORBID OBESITY WITH BMI OF 50.0-59.9, ADULT (HCC): ICD-10-CM

## 2022-03-15 DIAGNOSIS — E03.9 ACQUIRED HYPOTHYROIDISM: ICD-10-CM

## 2022-03-15 PROCEDURE — 99214 OFFICE O/P EST MOD 30 MIN: CPT | Performed by: PHYSICIAN ASSISTANT

## 2022-03-15 RX ORDER — ROSUVASTATIN CALCIUM 10 MG/1
10 TABLET, COATED ORAL
Qty: 90 TABLET | Refills: 1 | Status: SHIPPED | OUTPATIENT
Start: 2022-03-15

## 2022-03-15 NOTE — PROGRESS NOTES
Chidi Umana is a 32 y.o.  female presents today for office visit for acute care. Pt would also like to discuss results. Pt is not fasting. Pt is in Room # 6      1. Have you been to the ER, urgent care clinic since your last visit? Hospitalized since your last visit? No    2. Have you seen or consulted any other health care providers outside of the [address] [place] [address] since your last visit? Include any pap smears or colon screening.  No    Upcoming Appts  none    Health Maintenance reviewed    VORB:   Orders Placed This Encounter    Lisdexamfetamine (Vyvanse) 40 mg capsule   MARGOT Valdovinos-CCourtney Chaya Meigs, LPN

## 2022-03-18 PROBLEM — N92.6 IRREGULAR PERIODS: Status: ACTIVE | Noted: 2020-02-26

## 2022-03-18 PROBLEM — F32.1 CURRENT MODERATE EPISODE OF MAJOR DEPRESSIVE DISORDER WITHOUT PRIOR EPISODE (HCC): Status: ACTIVE | Noted: 2021-01-28

## 2022-03-18 PROBLEM — E78.5 DYSLIPIDEMIA: Status: ACTIVE | Noted: 2019-10-15

## 2022-03-19 PROBLEM — E11.65 TYPE 2 DIABETES MELLITUS WITH HYPERGLYCEMIA, WITH LONG-TERM CURRENT USE OF INSULIN (HCC): Status: ACTIVE | Noted: 2019-10-15

## 2022-03-19 PROBLEM — Z79.4 TYPE 2 DIABETES MELLITUS WITH HYPERGLYCEMIA, WITH LONG-TERM CURRENT USE OF INSULIN (HCC): Status: ACTIVE | Noted: 2019-10-15

## 2022-03-20 PROBLEM — N92.0 MENORRHAGIA: Status: ACTIVE | Noted: 2020-02-26

## 2022-05-15 NOTE — PROGRESS NOTES
HISTORY OF PRESENT ILLNESS  Kaitlin Dimas is a 32 y.o. female. HPI   Pt is being seen for f/u on her recent labs for her DM2, cholesterol, thyroid, and weight. She is having trouble loosing weight and was thinking about gastric bypass. She is not just interested in it for weight loss but also health benefits such as improving her cholesterol and diabetes. She has no additional concerns or complaints. Allergies   Allergen Reactions    Bactrim [Sulfamethoprim] Shortness of Breath and Rash    Keflex [Cephalexin] Shortness of Breath and Rash       Current Outpatient Medications   Medication Sig    Lisdexamfetamine (Vyvanse) 40 mg capsule Take 40 mg by mouth every morning.  rosuvastatin (CRESTOR) 10 mg tablet Take 1 Tablet by mouth nightly.  insulin glargine (Lantus Solostar U-100 Insulin) 100 unit/mL (3 mL) inpn 44 Units by SubCUTAneous route.  Trulicity 0.98 CQ/5.2 mL sub-q pen 0.75 mg by SubCUTAneous route every seven (7) days.  traZODone (DESYREL) 100 mg tablet     busPIRone (BUSPAR) 30 mg tablet     buPROPion XL (WELLBUTRIN XL) 150 mg tablet     levothyroxine (SYNTHROID) 50 mcg tablet Take 1 Tab by mouth daily.  Insulin Needles, Disposable, 31 gauge x 5/16\" ndle 1 Each by SubCUTAneous route daily.  FLUoxetine 60 mg tab     dicloxacillin (DYNAPEN) 250 mg capsule Take 2 Capsules by mouth Before breakfast, lunch, dinner and at bedtime. (Patient not taking: Reported on 3/15/2022)    amphetamine-dextroamphetamine XR (ADDERALL XR) 15 mg XR capsule  (Patient not taking: Reported on 3/15/2022)    melatonin 10 mg tab 200 mg. (Patient not taking: Reported on 3/21/7787)    D3-folic acid-collagen,bovine, 800 unit-1 mg- 300 mg cap Take 1 Cap by mouth. (Patient not taking: Reported on 3/15/2022)    magnesium 250 mg tab Take 1 Tab by mouth. (Patient not taking: Reported on 3/15/2022)     No current facility-administered medications for this visit. Review of Systems   Constitutional: Negative. Negative for chills, fever and malaise/fatigue. HENT: Negative. Negative for congestion, ear pain, sore throat and tinnitus. Eyes: Negative. Negative for blurred vision, double vision and photophobia. Respiratory: Negative. Negative for cough, shortness of breath and wheezing. Cardiovascular: Negative. Negative for chest pain, palpitations and leg swelling. Gastrointestinal: Negative. Negative for abdominal pain, heartburn, nausea and vomiting. Genitourinary: Negative. Negative for dysuria, frequency, hematuria and urgency. Musculoskeletal: Negative. Negative for back pain, joint pain, myalgias and neck pain. Skin: Negative for itching and rash. Neurological: Negative. Negative for dizziness, tingling, tremors and headaches. Psychiatric/Behavioral: Positive for depression (controlled on meds). Negative for memory loss. The patient is nervous/anxious (controlled on meds). The patient does not have insomnia. Visit Vitals  /88 (BP 1 Location: Right arm, BP Patient Position: Sitting)   Pulse (!) 102   Temp 99 °F (37.2 °C) (Temporal)   Resp 17   Ht 5' 3\" (1.6 m)   Wt 315 lb (142.9 kg)   SpO2 97%   BMI 55.80 kg/m²       Physical Exam  Vitals reviewed. Constitutional:       General: She is not in acute distress. Appearance: Normal appearance. She is obese. She is not ill-appearing. HENT:      Head: Normocephalic and atraumatic. Cardiovascular:      Rate and Rhythm: Regular rhythm. Tachycardia present. Pulses: Normal pulses. Heart sounds: Normal heart sounds. Pulmonary:      Effort: Pulmonary effort is normal.      Breath sounds: Normal breath sounds. Neurological:      Mental Status: She is alert and oriented to person, place, and time. Psychiatric:         Mood and Affect: Mood normal.         Behavior: Behavior normal.         Thought Content:  Thought content normal.         Judgment: Judgment normal.         ASSESSMENT and PLAN    ICD-10-CM ICD-9-CM 1. Type 2 diabetes mellitus with hyperglycemia, with long-term current use of insulin ([facility])  E11.65 250.00 MICROALBUMIN, UR, RAND W/ MICROALB/CREAT RATIO    Z79.4 790.29      V58.67    2. Hypercholesterolemia  E78.00 272.0 rosuvastatin (CRESTOR) 10 mg tablet   3. Morbid obesity with BMI of 50.0-59.9, adult ([facility])  E66.01 278.01     Z68.43 V85.43    4. Acquired hypothyroidism  E03.9 244.9      Follow-up and Dispositions    · Return in about 3 months (around 6/15/2022) for follow up. Pt expressed understanding of visit summary and plans for any follow ups or referrals as well as any medications prescribed.

## 2022-05-15 NOTE — PATIENT INSTRUCTIONS
Learning About Type 2 Diabetes  What is type 2 diabetes? Type 2 diabetes is a condition in which you have too much sugar (glucose) in your blood. Glucose is a type of sugar produced in your body when carbohydrates and other foods are digested. It provides energy to cells throughout the body. Normally, blood sugar levels increase after you eat a meal. When blood sugar rises, cells in the pancreas release insulin, which causes the body to absorb sugar from the blood and lowers the blood sugar level to normal.  When you have type 2 diabetes, sugar stays in the blood rather than entering the body's cells to be used for energy. This results in high blood sugar. It happens when your body can't use insulin the right way. Over time, high blood sugar can harm many parts of the body, such as your eyes, heart, blood vessels, nerves, and kidneys. It can also increase your risk for other health problems (complications). What can you expect with type 2 diabetes? Mima Kumar keep hearing about how important it is to keep your blood sugar within a target range. That's because over time, high blood sugar can lead to serious problems. It can:  · Harm your eyes, nerves, and kidneys. · Damage your blood vessels, leading to heart disease and stroke. · Reduce blood flow and cause nerve damage to parts of your body, especially your feet. This can cause slow healing and pain when you walk. · Make your immune system weak and less able to fight infections. When people hear the word \"diabetes,\" they often think of problems like these. But daily care and treatment can help prevent or delay these problems. The goal is to keep your blood sugar in a target range. That's the best way to reduce your chance of having more problems from diabetes. What are the symptoms? Some people who have type 2 diabetes may not have any symptoms early on.  Many people with the disease don't even know they have it at first. But with time, diabetes starts to cause symptoms. You have most symptoms of type 2 diabetes when your blood sugar is either too high or too low. The most common symptoms of high blood sugar include:  · Thirst.  · Needing to urinate often. · Weight loss. · Blurry vision. The symptoms of low blood sugar include:  · Sweating. · Shakiness. · Weakness. · Hunger. · Confusion. You're not likely to get symptoms of low blood sugar unless you take insulin or use certain diabetes medicines that lower blood sugar. How can you help prevent type 2 diabetes? There are things you can do to help prevent type 2 diabetes. Stay at a healthy weight. Exercise regularly, and eat healthy foods. Even small changes can make a difference. If you have prediabetes, the medicine metformin can help prevent type 2 diabetes. How is type 2 diabetes treated? Treatment for type 2 diabetes will change over time to meet your needs. But the focus of your treatment will usually be to keep your blood sugar levels in your target range. This will help prevent problems such as eye, kidney, heart, blood vessel, and nerve disease. Some people may need medicines to help their bodies make insulin or decrease insulin resistance. Some medicines slow down how quickly the body absorbs carbohydrates. Treatment to manage type 2 diabetes includes:  · Making healthy food choices and being active. · Losing weight, if you need to. · Seeing your doctor regularly. · Keeping your blood sugar in your target range. · Taking medicines, if you need them. · Quitting smoking, if you smoke. · Keeping your blood pressure and cholesterol under control. Follow-up care is a key part of your treatment and safety. Be sure to make and go to all appointments, and call your doctor if you are having problems. It's also a good idea to know your test results and keep a list of the medicines you take. Where can you learn more?   Go to http://www.gray.com/  Enter [id number] in the search box to learn more about \"Learning About Type 2 Diabetes. \"  Current as of: July 28, 2021               Content Version: 13.2  © 2006-2022 [employer]. Care instructions adapted under license by [employer] (which disclaims liability or warranty for this information). If you have questions about a medical condition or this instruction, always ask your healthcare professional. [place] [id number] any warranty or liability for your use of this information.

## 2022-07-12 ENCOUNTER — TELEPHONE (OUTPATIENT)
Dept: FAMILY MEDICINE CLINIC | Age: 27
End: 2022-07-12

## 2022-09-02 ENCOUNTER — NURSE TRIAGE (OUTPATIENT)
Dept: OTHER | Facility: CLINIC | Age: 27
End: 2022-09-02

## 2022-09-02 NOTE — TELEPHONE ENCOUNTER
Subjective: Caller states \"I have COVID and I have chest pain, it feels heavy and tight. Symptoms started on Sunday. \"     Current Symptoms: Chest pain/pressure, chills, achy joints, SOB - I have to breath deeply, tiredness     Onset: 5 days ago; sudden    Associated Symptoms: diarrhea    Pain Severity: 4/10; Pressure; constant    Temperature: Patient denies by unknown method    What has been tried: sleeping a lot. LMP:  12/2021  Pregnant: No    Recommended disposition: Go to ED Now    Care advice provided, patient verbalizes understanding; denies any other questions or concerns; instructed to call back for any new or worsening symptoms. Patient/caller agrees to proceed to nearest Emergency Department    Attention Provider: Thank you for allowing me to participate in the care of your patient. The patient was connected to triage in response to symptoms provided. Please do not respond through this encounter as the response is not directed to a shared pool.       Reason for Disposition   SEVERE or constant chest pain or pressure  (Exception: Mild central chest pain, present only when coughing.)    Protocols used: Coronavirus (COVID-19) Diagnosed or Suspected-ADULT-[place]

## 2022-12-29 DIAGNOSIS — E78.00 HYPERCHOLESTEROLEMIA: ICD-10-CM

## 2022-12-29 NOTE — TELEPHONE ENCOUNTER
This patient contacted the office for the following prescriptions to be refilled:    Medication requested :   Requested Prescriptions     Pending Prescriptions Disp Refills    rosuvastatin (CRESTOR) 10 mg tablet 90 Tablet 1     Sig: Take 1 Tablet by mouth nightly. Trulicity 9.00 HQ/7.5 mL sub-q pen       Si.5 mL by SubCUTAneous route every seven (7) days. levothyroxine (SYNTHROID) 50 mcg tablet 90 Tablet 0     Sig: Take 1 Tablet by mouth daily. PCP: PASQUALE Wolfe  LOV: Visit date not found  NOV DMA: 1/10/2023  FUTURE APPT:   Future Appointments   Date Time Provider Fatmata Ayers   1/10/2023  2:00 PM Ryder Jones MD DMA BS AMB         Thank you.

## 2022-12-30 RX ORDER — ROSUVASTATIN CALCIUM 10 MG/1
10 TABLET, COATED ORAL
Qty: 90 TABLET | Refills: 1 | Status: SHIPPED | OUTPATIENT
Start: 2022-12-30

## 2022-12-30 RX ORDER — LEVOTHYROXINE SODIUM 50 UG/1
50 TABLET ORAL DAILY
Qty: 90 TABLET | Refills: 0 | Status: SHIPPED | OUTPATIENT
Start: 2022-12-30

## 2022-12-30 RX ORDER — DULAGLUTIDE 0.75 MG/.5ML
0.75 INJECTION, SOLUTION SUBCUTANEOUS
Qty: 12 PEN | Refills: 3 | Status: SHIPPED | OUTPATIENT
Start: 2022-12-30

## 2023-01-10 ENCOUNTER — OFFICE VISIT (OUTPATIENT)
Dept: FAMILY MEDICINE CLINIC | Age: 28
End: 2023-01-10
Payer: COMMERCIAL

## 2023-01-10 VITALS
RESPIRATION RATE: 17 BRPM | HEART RATE: 116 BPM | WEIGHT: 293 LBS | HEIGHT: 63 IN | TEMPERATURE: 98.2 F | SYSTOLIC BLOOD PRESSURE: 128 MMHG | OXYGEN SATURATION: 98 % | BODY MASS INDEX: 51.91 KG/M2 | DIASTOLIC BLOOD PRESSURE: 76 MMHG

## 2023-01-10 DIAGNOSIS — E66.01 MORBID OBESITY WITH BMI OF 50.0-59.9, ADULT (HCC): ICD-10-CM

## 2023-01-10 DIAGNOSIS — D22.9 ATYPICAL MOLE: ICD-10-CM

## 2023-01-10 DIAGNOSIS — Z23 ENCOUNTER FOR IMMUNIZATION: ICD-10-CM

## 2023-01-10 DIAGNOSIS — F32.1 CURRENT MODERATE EPISODE OF MAJOR DEPRESSIVE DISORDER WITHOUT PRIOR EPISODE (HCC): ICD-10-CM

## 2023-01-10 DIAGNOSIS — Z79.4 TYPE 2 DIABETES MELLITUS WITH HYPERGLYCEMIA, WITH LONG-TERM CURRENT USE OF INSULIN (HCC): Primary | ICD-10-CM

## 2023-01-10 DIAGNOSIS — E11.65 TYPE 2 DIABETES MELLITUS WITH HYPERGLYCEMIA, WITH LONG-TERM CURRENT USE OF INSULIN (HCC): Primary | ICD-10-CM

## 2023-01-10 PROCEDURE — 99204 OFFICE O/P NEW MOD 45 MIN: CPT | Performed by: STUDENT IN AN ORGANIZED HEALTH CARE EDUCATION/TRAINING PROGRAM

## 2023-01-10 PROCEDURE — 90686 IIV4 VACC NO PRSV 0.5 ML IM: CPT | Performed by: STUDENT IN AN ORGANIZED HEALTH CARE EDUCATION/TRAINING PROGRAM

## 2023-01-10 PROCEDURE — 90472 IMMUNIZATION ADMIN EACH ADD: CPT | Performed by: STUDENT IN AN ORGANIZED HEALTH CARE EDUCATION/TRAINING PROGRAM

## 2023-01-10 PROCEDURE — 90677 PCV20 VACCINE IM: CPT | Performed by: STUDENT IN AN ORGANIZED HEALTH CARE EDUCATION/TRAINING PROGRAM

## 2023-01-10 PROCEDURE — 90471 IMMUNIZATION ADMIN: CPT | Performed by: STUDENT IN AN ORGANIZED HEALTH CARE EDUCATION/TRAINING PROGRAM

## 2023-01-10 NOTE — PROGRESS NOTES
[facility] for   Chief Complaint   Patient presents with    Establish Care    Hemorrhoids       Is someone accompanying this pt? no    Is the patient using any DME equipment during OV? no    Depression Screening:  3 most recent PHQ Screens 1/10/2023   Little interest or pleasure in doing things Nearly every day   Feeling down, depressed, irritable, or hopeless Nearly every day   Total Score PHQ 2 6   Trouble falling or staying asleep, or sleeping too much Nearly every day   Feeling tired or having little energy Nearly every day   Poor appetite, weight loss, or overeating Nearly every day   Feeling bad about yourself - or that you are a failure or have let yourself or your family down Nearly every day   Trouble concentrating on things such as school, work, reading, or watching TV Nearly every day   Moving or speaking so slowly that other people could have noticed; or the opposite being so fidgety that others notice Not at all   Thoughts of being better off dead, or hurting yourself in some way Not at all   PHQ 9 Score 21   How difficult have these problems made it for you to do your work, take care of your home and get along with others Somewhat difficult       Learning Assessment:  No flowsheet data found. Abuse Screening:  Abuse Screening Questionnaire 2/26/2020   Do you ever feel afraid of your partner? N   Are you in a relationship with someone who physically or mentally threatens you? N   Is it safe for you to go home? Y       Fall Risk  No flowsheet data found. Health Maintenance reviewed and discussed and ordered per Provider.     Health Maintenance Due   Topic Date Due    Hepatitis C Screening  Never done    Pneumococcal 0-64 years (1 - PCV) Never done    Foot Exam Q1  Never done    Eye Exam Retinal or Dilated  Never done    Hepatitis B Vaccine (1 of 3 - Risk 3-dose series) Never done    Diabetic Alb to Cr ratio (uACR) test  02/28/2021    A1C test (Diabetic or Prediabetic)  06/04/2021 COVID-19 Vaccine (3 - Booster for Pfizer series) 07/16/2021    Flu Vaccine (1) 08/01/2022    Cervical cancer screen  10/27/2022    GFR test (Diabetes, CKD 3-4, OR last GFR 15-59)  02/10/2023   . 1. \"Have you been to the ER, urgent care clinic since your last visit? Hospitalized since your last visit? \" No    2. \"Have you seen or consulted any other health care providers outside of the [address] [place] [address] since your last visit? \" No     3. For patients over 45: Has the patient had a colonoscopy? NA - based on age     If the patient is female:    4. For patients over 36: Has the patient had a mammogram? NA - based on age    11. For patients over 21: Has the patient had a pap smear?  No

## 2023-01-10 NOTE — PROGRESS NOTES
History of Present Illness  Kaitlin Clancy is a 25 y.o. female who presents today for management of    Chief Complaint   Patient presents with    Establish Care    Hemorrhoids     CC: new patient here for healthcare maintenance    Patient is here to establish care. Previous PCP: Ashley Cuellar NP, w/ [facility], last seen 5/2022    Currently unemployed, does writing/art/blog. She lives at home with mom and dad. Pt reports good support system with family/friends feels safe at home.      T2DM:  -Previously seen by [facility] Endocrinology  -does not take glargine daily, trying to work on this  Denies hypoglycemia  Does not regularly check glucose, however fastings sometimes in 300s   -last A1c, possibly around 8, pt unsure     Healthcare maintenance:  Immunizations:  Flu vacc: overdue   TDaP vacc: UTD, due 8/2023  Pneumonia vacc: never done, hx of T2DM  COVID vacc: overdue for booster   Last Pap: 2017, overdue for repeat, follows with gynecology     Diet/Exercise:  1-2hrs fo walking daily   Sexual health: Pt declines STI screening  Mood: followed by Psychiatry for ongoing depression and anxiety   3 most recent PHQ Screens 1/10/2023   Little interest or pleasure in doing things Nearly every day   Feeling down, depressed, irritable, or hopeless Nearly every day   Total Score PHQ 2 6   Trouble falling or staying asleep, or sleeping too much Nearly every day   Feeling tired or having little energy Nearly every day   Poor appetite, weight loss, or overeating Nearly every day   Feeling bad about yourself - or that you are a failure or have let yourself or your family down Nearly every day   Trouble concentrating on things such as school, work, reading, or watching TV Nearly every day   Moving or speaking so slowly that other people could have noticed; or the opposite being so fidgety that others notice Not at all   Thoughts of being better off dead, or hurting yourself in some way Not at all   PHQ 9 Score 21   How difficult have these problems made it for you to do your work, take care of your home and get along with others Somewhat difficult     Past Medical History  Past Medical History:   Diagnosis Date    ADHD     Anxiety     Current moderate episode of major depressive disorder without prior episode ([facility] [address] 1/28/2021    Depression     Diabetes ([facility]     Hypercholesterolemia     Insomnia     Thyroid disease       Surgical History  History reviewed. No pertinent surgical history. Current Medications  Current Outpatient Medications   Medication Sig    rosuvastatin (CRESTOR) 10 mg tablet Take 1 Tablet by mouth nightly. Trulicity 1.36 QW/7.1 mL sub-q pen 0.5 mL by SubCUTAneous route every seven (7) days. levothyroxine (SYNTHROID) 50 mcg tablet Take 1 Tablet by mouth daily. Lisdexamfetamine (VYVANSE) 40 mg capsule Take 40 mg by mouth every morning. insulin glargine (Lantus Solostar U-100 Insulin) 100 unit/mL (3 mL) inpn 44 Units by SubCUTAneous route. traZODone (DESYREL) 100 mg tablet     busPIRone (BUSPAR) 30 mg tablet     buPROPion XL (WELLBUTRIN XL) 150 mg tablet     Insulin Needles, Disposable, 31 gauge x 5/16\" ndle 1 Each by SubCUTAneous route daily. FLUoxetine 60 mg tab     dicloxacillin (DYNAPEN) 250 mg capsule Take 2 Capsules by mouth Before breakfast, lunch, dinner and at bedtime. (Patient not taking: Reported on 3/15/2022)    amphetamine-dextroamphetamine XR (ADDERALL XR) 15 mg XR capsule  (Patient not taking: Reported on 3/15/2022)    melatonin 10 mg tab 200 mg. (Patient not taking: Reported on 7/27/3642)    D3-folic acid-collagen,bovine, 800 unit-1 mg- 300 mg cap Take 1 Cap by mouth. (Patient not taking: Reported on 3/15/2022)    magnesium 250 mg tab Take 1 Tab by mouth. (Patient not taking: Reported on 3/15/2022)     No current facility-administered medications for this visit.      Allergies/Drug Reactions  Allergies   Allergen Reactions    Bactrim [Sulfamethoprim] Shortness of Breath and Rash    Keflex [Cephalexin] Shortness of Breath and Rash      Family History  Family History   Problem Relation Age of Onset    Diabetes Mother     Diabetes Father       Social History  Social History     Tobacco Use    Smoking status: Never    Smokeless tobacco: Never   Substance Use Topics    Alcohol use: No    Drug use: Never      Health Maintenance   Topic Date Due    Hepatitis C Screening  Never done    Pneumococcal 0-64 years (1 - PCV) Never done    Foot Exam Q1  Never done    Eye Exam Retinal or Dilated  Never done    Hepatitis B Vaccine (1 of 3 - Risk 3-dose series) Never done    Diabetic Alb to Cr ratio (uACR) test  02/28/2021    A1C test (Diabetic or Prediabetic)  06/04/2021    COVID-19 Vaccine (3 - Booster for Pfizer series) 07/16/2021    Flu Vaccine (1) 08/01/2022    Cervical cancer screen  10/27/2022    GFR test (Diabetes, CKD 3-4, OR last GFR 15-59)  02/10/2023    Lipid Screen  02/10/2023    Depression Monitoring  03/15/2023    DTaP/Tdap/Td series (2 - Td or Tdap) 08/05/2023     Immunization History   Administered Date(s) Administered    COVID-19, PFIZER PURPLE top, DILUTE for use, (age 15 y+), IM, 30mcg/0.3mL 04/30/2021, 05/21/2021    HPV (9-valent) 07/20/2016, 04/20/2018    HPV (Quad) 08/05/2013    Influenza Vaccine 11/13/2017, 10/15/2019    Tdap 08/05/2013       Review of Systems  All other systems reviewed and negative except as noted in HPI. Physical Exam  Vital signs:   Vitals:    01/10/23 1405   BP: 128/76   Pulse: (!) 116   Resp: 17   Temp: 98.2 °F (36.8 °C)   TempSrc: Temporal   SpO2: 98%   Weight: 307 lb 12.8 oz (139.6 kg)   Height: 5' 3\" (1.6 m)       General: alert, oriented, not in distress  Heart: normal rate, regular rhythm, no murmur  Extremities: no focal deformities, no edema  Skin: 2cm mole on R flank, regular borders, superficial scabbing      Assessment/Plan:    Diagnoses and all orders for this visit:    1.  Type 2 diabetes mellitus with hyperglycemia, with long-term current use of insulin [facility]): -Pt would like to re-establish care with [facility] Endocrinology.   -     HEMOGLOBIN A1C WITH EAG; Future  -     LIPID PANEL; Future  -     REFERRAL TO ENDOCRINOLOGY    2. Encounter for immunization:  -     INFLUENZA, FLUARIX, FLULAVAL, FLUZONE (AGE 6 MO+), AFLURIA(AGE 3Y+) IM, PF, 0.5 ML  -     PNEUMOCOCCAL, PCV20, PREVNAR 20, (AGE 18 YRS+), IM, PF    3. Morbid obesity with BMI of 50.0-59.9, adult ([facility] [address]  Discussed increased physical activity for weight loss and overall cardiovascular benefit    4. Current moderate episode of major depressive disorder without prior episode ([facility] [address]):  Stable. Management per Psychiatry    5. Atypical mole  Pt requesting referral to dermatology for removal of mole given bothersome bleeding.   -     REFERRAL TO DERMATOLOGY        I have discussed the diagnosis with the patient and the intended plan as seen in the above orders. The patient has received an after-visit summary and questions were answered concerning future plans. I have discussed medication side effects and warnings with the patient as well. I have reviewed the plan of care with the patient, accepted their input and they are in agreement with the treatment goals.          Prashant Vance MD  January 10, 2023

## 2023-07-03 RX ORDER — ROSUVASTATIN CALCIUM 10 MG/1
10 TABLET, COATED ORAL DAILY
Qty: 90 TABLET | Refills: 0 | Status: SHIPPED | OUTPATIENT
Start: 2023-07-03

## 2023-07-03 NOTE — TELEPHONE ENCOUNTER
This patient contacted the office for the following prescriptions to be refilled:    Medication requested :   Requested Prescriptions     Pending Prescriptions Disp Refills    rosuvastatin (CRESTOR) 10 MG tablet 30 tablet      Sig: Take 1 tablet by mouth      PCP: Julia Azevedo MD  LOV:           [id number] an IN OFFICE  NOV DMA: Visit date not found  FUTURE APPT: No future appointments. Thank you.

## 2023-09-11 ENCOUNTER — HOSPITAL ENCOUNTER (OUTPATIENT)
Facility: HOSPITAL | Age: 28
Setting detail: SPECIMEN
Discharge: HOME | End: 2023-09-14
Payer: COMMERCIAL

## 2023-09-11 PROCEDURE — 88175 CYTOPATH C/V AUTO FLUID REDO: CPT

## 2023-09-11 PROCEDURE — 87624 HPV HI-RISK TYP POOLED RSLT: CPT

## 2024-04-26 ENCOUNTER — HOSPITAL ENCOUNTER (OUTPATIENT)
Facility: HOSPITAL | Age: 29
Setting detail: SPECIMEN
End: 2024-04-26
Payer: COMMERCIAL

## 2024-04-26 ENCOUNTER — OFFICE VISIT (OUTPATIENT)
Facility: CLINIC | Age: 29
End: 2024-04-26
Payer: COMMERCIAL

## 2024-04-26 VITALS
BODY MASS INDEX: 51.91 KG/M2 | OXYGEN SATURATION: 98 % | HEART RATE: 100 BPM | TEMPERATURE: 97.6 F | DIASTOLIC BLOOD PRESSURE: 87 MMHG | SYSTOLIC BLOOD PRESSURE: 130 MMHG | RESPIRATION RATE: 15 BRPM | WEIGHT: 293 LBS | HEIGHT: 63 IN

## 2024-04-26 DIAGNOSIS — Z11.59 ENCOUNTER FOR HEPATITIS C SCREENING TEST FOR LOW RISK PATIENT: ICD-10-CM

## 2024-04-26 DIAGNOSIS — Z79.4 TYPE 2 DIABETES MELLITUS WITH HYPERGLYCEMIA, WITH LONG-TERM CURRENT USE OF INSULIN (HCC): ICD-10-CM

## 2024-04-26 DIAGNOSIS — Z00.00 ANNUAL PHYSICAL EXAM: ICD-10-CM

## 2024-04-26 DIAGNOSIS — E11.65 TYPE 2 DIABETES MELLITUS WITH HYPERGLYCEMIA, WITH LONG-TERM CURRENT USE OF INSULIN (HCC): ICD-10-CM

## 2024-04-26 DIAGNOSIS — F32.1 MAJOR DEPRESSIVE DISORDER, SINGLE EPISODE, MODERATE (HCC): ICD-10-CM

## 2024-04-26 DIAGNOSIS — K64.4 EXTERNAL HEMORRHOID: ICD-10-CM

## 2024-04-26 DIAGNOSIS — E03.9 ACQUIRED HYPOTHYROIDISM: ICD-10-CM

## 2024-04-26 DIAGNOSIS — Z00.00 ANNUAL PHYSICAL EXAM: Primary | ICD-10-CM

## 2024-04-26 DIAGNOSIS — K62.5 BRBPR (BRIGHT RED BLOOD PER RECTUM): ICD-10-CM

## 2024-04-26 LAB
ALBUMIN SERPL-MCNC: 3.5 G/DL (ref 3.4–5)
ALBUMIN/GLOB SERPL: 1.1 (ref 0.8–1.7)
ALP SERPL-CCNC: 83 U/L (ref 45–117)
ALT SERPL-CCNC: 19 U/L (ref 13–56)
ANION GAP SERPL CALC-SCNC: 5 MMOL/L (ref 3–18)
AST SERPL-CCNC: 7 U/L (ref 10–38)
BASOPHILS # BLD: 0 K/UL (ref 0–0.1)
BASOPHILS NFR BLD: 0 % (ref 0–2)
BILIRUB SERPL-MCNC: 0.5 MG/DL (ref 0.2–1)
BUN SERPL-MCNC: 13 MG/DL (ref 7–18)
BUN/CREAT SERPL: 17 (ref 12–20)
CALCIUM SERPL-MCNC: 9.2 MG/DL (ref 8.5–10.1)
CHLORIDE SERPL-SCNC: 103 MMOL/L (ref 100–111)
CHOLEST SERPL-MCNC: 199 MG/DL
CO2 SERPL-SCNC: 28 MMOL/L (ref 21–32)
CREAT SERPL-MCNC: 0.77 MG/DL (ref 0.6–1.3)
CREAT UR-MCNC: 202 MG/DL (ref 30–125)
DIFFERENTIAL METHOD BLD: ABNORMAL
EOSINOPHIL # BLD: 0.2 K/UL (ref 0–0.4)
EOSINOPHIL NFR BLD: 1 % (ref 0–5)
ERYTHROCYTE [DISTWIDTH] IN BLOOD BY AUTOMATED COUNT: 14.2 % (ref 11.6–14.5)
GLOBULIN SER CALC-MCNC: 3.1 G/DL (ref 2–4)
GLUCOSE SERPL-MCNC: 155 MG/DL (ref 74–99)
HBA1C MFR BLD: 7.2 % (ref 4.2–5.6)
HCT VFR BLD AUTO: 39.5 % (ref 35–45)
HDLC SERPL-MCNC: 36 MG/DL (ref 40–60)
HDLC SERPL: 5.5 (ref 0–5)
HGB BLD-MCNC: 11.8 G/DL (ref 12–16)
IMM GRANULOCYTES # BLD AUTO: 0.1 K/UL (ref 0–0.04)
IMM GRANULOCYTES NFR BLD AUTO: 1 % (ref 0–0.5)
LDLC SERPL CALC-MCNC: 151.2 MG/DL (ref 0–100)
LIPID PANEL: ABNORMAL
LYMPHOCYTES # BLD: 1.8 K/UL (ref 0.9–3.6)
LYMPHOCYTES NFR BLD: 15 % (ref 21–52)
MCH RBC QN AUTO: 26.1 PG (ref 24–34)
MCHC RBC AUTO-ENTMCNC: 29.9 G/DL (ref 31–37)
MCV RBC AUTO: 87.4 FL (ref 78–100)
MICROALBUMIN UR-MCNC: 2.88 MG/DL (ref 0–3)
MICROALBUMIN/CREAT UR-RTO: 14 MG/G (ref 0–30)
MONOCYTES # BLD: 0.7 K/UL (ref 0.05–1.2)
MONOCYTES NFR BLD: 6 % (ref 3–10)
NEUTS SEG # BLD: 9.2 K/UL (ref 1.8–8)
NEUTS SEG NFR BLD: 77 % (ref 40–73)
NRBC # BLD: 0 K/UL (ref 0–0.01)
NRBC BLD-RTO: 0 PER 100 WBC
PLATELET # BLD AUTO: 343 K/UL (ref 135–420)
PMV BLD AUTO: 11.8 FL (ref 9.2–11.8)
POTASSIUM SERPL-SCNC: 4.3 MMOL/L (ref 3.5–5.5)
PROT SERPL-MCNC: 6.6 G/DL (ref 6.4–8.2)
RBC # BLD AUTO: 4.52 M/UL (ref 4.2–5.3)
SODIUM SERPL-SCNC: 136 MMOL/L (ref 136–145)
T4 FREE SERPL-MCNC: 1.3 NG/DL (ref 0.7–1.5)
TRIGL SERPL-MCNC: 59 MG/DL
TSH SERPL DL<=0.05 MIU/L-ACNC: 1.97 UIU/ML (ref 0.36–3.74)
VLDLC SERPL CALC-MCNC: 11.8 MG/DL
WBC # BLD AUTO: 11.9 K/UL (ref 4.6–13.2)

## 2024-04-26 PROCEDURE — 80061 LIPID PANEL: CPT

## 2024-04-26 PROCEDURE — 82043 UR ALBUMIN QUANTITATIVE: CPT

## 2024-04-26 PROCEDURE — 83036 HEMOGLOBIN GLYCOSYLATED A1C: CPT

## 2024-04-26 PROCEDURE — 85025 COMPLETE CBC W/AUTO DIFF WBC: CPT

## 2024-04-26 PROCEDURE — 86803 HEPATITIS C AB TEST: CPT

## 2024-04-26 PROCEDURE — 84443 ASSAY THYROID STIM HORMONE: CPT

## 2024-04-26 PROCEDURE — 82570 ASSAY OF URINE CREATININE: CPT

## 2024-04-26 PROCEDURE — 99395 PREV VISIT EST AGE 18-39: CPT | Performed by: STUDENT IN AN ORGANIZED HEALTH CARE EDUCATION/TRAINING PROGRAM

## 2024-04-26 PROCEDURE — 80053 COMPREHEN METABOLIC PANEL: CPT

## 2024-04-26 PROCEDURE — 84439 ASSAY OF FREE THYROXINE: CPT

## 2024-04-26 PROCEDURE — 36415 COLL VENOUS BLD VENIPUNCTURE: CPT

## 2024-04-26 RX ORDER — ROSUVASTATIN CALCIUM 10 MG/1
10 TABLET, COATED ORAL DAILY
Qty: 90 TABLET | Refills: 0 | Status: SHIPPED | OUTPATIENT
Start: 2024-04-26

## 2024-04-26 RX ORDER — HYDROCORTISONE 25 MG/G
CREAM TOPICAL
Qty: 28 G | Refills: 2 | Status: SHIPPED | OUTPATIENT
Start: 2024-04-26

## 2024-04-26 SDOH — ECONOMIC STABILITY: FOOD INSECURITY: WITHIN THE PAST 12 MONTHS, THE FOOD YOU BOUGHT JUST DIDN'T LAST AND YOU DIDN'T HAVE MONEY TO GET MORE.: NEVER TRUE

## 2024-04-26 SDOH — ECONOMIC STABILITY: FOOD INSECURITY: WITHIN THE PAST 12 MONTHS, YOU WORRIED THAT YOUR FOOD WOULD RUN OUT BEFORE YOU GOT MONEY TO BUY MORE.: NEVER TRUE

## 2024-04-26 SDOH — ECONOMIC STABILITY: INCOME INSECURITY: HOW HARD IS IT FOR YOU TO PAY FOR THE VERY BASICS LIKE FOOD, HOUSING, MEDICAL CARE, AND HEATING?: NOT HARD AT ALL

## 2024-04-26 SDOH — ECONOMIC STABILITY: HOUSING INSECURITY
IN THE LAST 12 MONTHS, WAS THERE A TIME WHEN YOU DID NOT HAVE A STEADY PLACE TO SLEEP OR SLEPT IN A SHELTER (INCLUDING NOW)?: NO

## 2024-04-26 ASSESSMENT — ENCOUNTER SYMPTOMS
ANAL BLEEDING: 1
NAUSEA: 0
VOMITING: 0
SHORTNESS OF BREATH: 0
ABDOMINAL PAIN: 0
DIARRHEA: 0
CONSTIPATION: 0

## 2024-04-26 ASSESSMENT — PATIENT HEALTH QUESTIONNAIRE - PHQ9
8. MOVING OR SPEAKING SO SLOWLY THAT OTHER PEOPLE COULD HAVE NOTICED. OR THE OPPOSITE, BEING SO FIGETY OR RESTLESS THAT YOU HAVE BEEN MOVING AROUND A LOT MORE THAN USUAL: NOT AT ALL
9. THOUGHTS THAT YOU WOULD BE BETTER OFF DEAD, OR OF HURTING YOURSELF: NEARLY EVERY DAY
2. FEELING DOWN, DEPRESSED OR HOPELESS: NEARLY EVERY DAY
5. POOR APPETITE OR OVEREATING: NEARLY EVERY DAY
7. TROUBLE CONCENTRATING ON THINGS, SUCH AS READING THE NEWSPAPER OR WATCHING TELEVISION: NEARLY EVERY DAY
3. TROUBLE FALLING OR STAYING ASLEEP: NEARLY EVERY DAY
4. FEELING TIRED OR HAVING LITTLE ENERGY: NEARLY EVERY DAY
SUM OF ALL RESPONSES TO PHQ QUESTIONS 1-9: 23
SUM OF ALL RESPONSES TO PHQ QUESTIONS 1-9: 20
SUM OF ALL RESPONSES TO PHQ QUESTIONS 1-9: 23
6. FEELING BAD ABOUT YOURSELF - OR THAT YOU ARE A FAILURE OR HAVE LET YOURSELF OR YOUR FAMILY DOWN: NEARLY EVERY DAY
SUM OF ALL RESPONSES TO PHQ QUESTIONS 1-9: 23
1. LITTLE INTEREST OR PLEASURE IN DOING THINGS: MORE THAN HALF THE DAYS
10. IF YOU CHECKED OFF ANY PROBLEMS, HOW DIFFICULT HAVE THESE PROBLEMS MADE IT FOR YOU TO DO YOUR WORK, TAKE CARE OF THINGS AT HOME, OR GET ALONG WITH OTHER PEOPLE: SOMEWHAT DIFFICULT
SUM OF ALL RESPONSES TO PHQ9 QUESTIONS 1 & 2: 5

## 2024-04-26 ASSESSMENT — COLUMBIA-SUICIDE SEVERITY RATING SCALE - C-SSRS
1. WITHIN THE PAST MONTH, HAVE YOU WISHED YOU WERE DEAD OR WISHED YOU COULD GO TO SLEEP AND NOT WAKE UP?: YES
7. DID THIS OCCUR IN THE LAST THREE MONTHS: NO
6. HAVE YOU EVER DONE ANYTHING, STARTED TO DO ANYTHING, OR PREPARED TO DO ANYTHING TO END YOUR LIFE?: YES
2. HAVE YOU ACTUALLY HAD ANY THOUGHTS OF KILLING YOURSELF?: NO

## 2024-04-26 NOTE — PROGRESS NOTES
Humera Ferrera is a 29 y.o. year old female who presents today for   Chief Complaint   Patient presents with    Follow-up       Is someone accompanying this pt? no    Is the patient using any DME equipment during OV? no    Depression Screenin/26/2024     9:22 AM 1/10/2023     2:01 PM   PHQ-9 Questionaire   Little interest or pleasure in doing things 2 3   Feeling down, depressed, or hopeless 3 3   Trouble falling or staying asleep, or sleeping too much 3 3   Feeling tired or having little energy 3 3   Poor appetite or overeating 3 3   Feeling bad about yourself - or that you are a failure or have let yourself or your family down 3 3   Trouble concentrating on things, such as reading the newspaper or watching television 3 3   Moving or speaking so slowly that other people could have noticed. Or the opposite - being so fidgety or restless that you have been moving around a lot more than usual 0 0   Thoughts that you would be better off dead, or of hurting yourself in some way 3    PHQ-9 Total Score 23 21   If you checked off any problems, how difficult have these problems made it for you to do your work, take care of things at home, or get along with other people? 1        Abuse Screening:       No data to display                Learning Assessment:  No question data found.    Fall Risk:       No data to display                    Coordination of Care:   1. \"Have you been to the ER, urgent care clinic since your last visit?  Hospitalized since your last visit?\" no    2. \"Have you seen or consulted any other health care providers outside of the [facility] since your last visit?\" no    3. For patients aged 45-75: Has the patient had a colonoscopy / FIT/ Cologuard? no    If the patient is female:    4. For patients aged 40-74: Has the patient had a mammogram within the past 2 years? no    5. For patients aged 21-65: Has the patient had a pap smear? No, need to schedule    Health Maintenance: 
Future  -     CBC with Auto Differential; Future    Type 2 diabetes mellitus with hyperglycemia, with long-term current use of insulin (HCC)  -management per Endocrinology  -     rosuvastatin (CRESTOR) 10 MG tablet; Take 1 tablet by mouth daily  -     Cancel: AMB POC HEMOGLOBIN A1C  -     Microalbumin / Creatinine Urine Ratio; Future  -     Lipid Panel; Future  -     HEMOGLOBIN A1C W/O EAG; Future    Body mass index (BMI) 50.0-59.9, adult (HCC)  -discussed importance of healthy eating and exercise for weight loss    Major depressive disorder, single episode, moderate (HCC)  -stable  -management er Psychiatry    Acquired hypothyroidism  -continue synthroid, per Endocrinology  -     TSH + Free T4 Panel; Future    BRBPR (bright red blood per rectum)  External hemorrhoid  -external hemorrhoid on exam, pt given anusol PRN  -     External Referral To Gastroenterology  -     hydrocortisone (ANUSOL-HC) 2.5 % CREA rectal cream; Apply twice daily as needed.    Encounter for hepatitis C screening test for low risk patient  -     Hepatitis C Antibody; Future          On this date 4/26/2024 I have spent 20 minutes reviewing previous notes, test results and face to face with the patient discussing the diagnosis and importance of compliance with the treatment plan as well as documenting on the day of the visit.    I have discussed the diagnosis with the patient and the intended plan as seen in the above orders.  The patient has received an after-visit summary and questions were answered concerning future plans.  I have discussed medication side effects and warnings with the patient as well. I have reviewed the plan of care with the patient, accepted their input and they are in agreement with the treatment goals.     Follow-up and Dispositions    Return in about 6 months (around 10/26/2024) for Chronic Disease f/u.       Shara Rodriguez MD   April 26, 2024

## 2024-04-29 DIAGNOSIS — E11.65 TYPE 2 DIABETES MELLITUS WITH HYPERGLYCEMIA, WITH LONG-TERM CURRENT USE OF INSULIN (HCC): ICD-10-CM

## 2024-04-29 DIAGNOSIS — Z79.4 TYPE 2 DIABETES MELLITUS WITH HYPERGLYCEMIA, WITH LONG-TERM CURRENT USE OF INSULIN (HCC): ICD-10-CM

## 2024-04-29 LAB
HCV AB SER IA-ACNC: 0.08 INDEX
HCV AB SERPL QL IA: NEGATIVE
HEPATITIS C COMMENT: NORMAL

## 2024-04-29 RX ORDER — ROSUVASTATIN CALCIUM 20 MG/1
20 TABLET, COATED ORAL DAILY
Qty: 90 TABLET | Refills: 2 | Status: SHIPPED | OUTPATIENT
Start: 2024-04-29

## 2024-05-09 DIAGNOSIS — F90.9 ATTENTION DEFICIT HYPERACTIVITY DISORDER (ADHD), UNSPECIFIED ADHD TYPE: ICD-10-CM

## 2024-05-09 DIAGNOSIS — F32.1 CURRENT MODERATE EPISODE OF MAJOR DEPRESSIVE DISORDER WITHOUT PRIOR EPISODE (HCC): Primary | ICD-10-CM

## 2024-05-09 RX ORDER — LISDEXAMFETAMINE DIMESYLATE 40 MG/1
40 CAPSULE ORAL DAILY
Qty: 30 CAPSULE | Refills: 0 | Status: SHIPPED | OUTPATIENT
Start: 2024-05-09 | End: 2024-05-15 | Stop reason: SDUPTHER

## 2024-05-09 RX ORDER — BUPROPION HYDROCHLORIDE 150 MG/1
150 TABLET ORAL EVERY MORNING
Qty: 90 TABLET | Refills: 2 | Status: SHIPPED | OUTPATIENT
Start: 2024-05-09 | End: 2024-05-15 | Stop reason: SDUPTHER

## 2024-05-09 RX ORDER — BUSPIRONE HYDROCHLORIDE 30 MG/1
30 TABLET ORAL 2 TIMES DAILY
Qty: 180 TABLET | Refills: 2 | Status: SHIPPED | OUTPATIENT
Start: 2024-05-09 | End: 2024-05-15 | Stop reason: SDUPTHER

## 2024-05-09 RX ORDER — TRAZODONE HYDROCHLORIDE 100 MG/1
100 TABLET ORAL NIGHTLY
Qty: 90 TABLET | Refills: 2 | Status: SHIPPED | OUTPATIENT
Start: 2024-05-09 | End: 2024-05-15 | Stop reason: SDUPTHER

## 2024-05-09 RX ORDER — FLUOXETINE HYDROCHLORIDE 60 MG/1
60 TABLET, FILM COATED ORAL; ORAL DAILY
Qty: 90 TABLET | Refills: 2 | Status: SHIPPED | OUTPATIENT
Start: 2024-05-09 | End: 2024-05-15 | Stop reason: SDUPTHER

## 2024-05-13 DIAGNOSIS — F90.9 ATTENTION DEFICIT HYPERACTIVITY DISORDER (ADHD), UNSPECIFIED ADHD TYPE: ICD-10-CM

## 2024-05-13 RX ORDER — LISDEXAMFETAMINE DIMESYLATE 40 MG/1
40 CAPSULE ORAL DAILY
Qty: 30 CAPSULE | Refills: 0 | OUTPATIENT
Start: 2024-05-13 | End: 2024-06-12

## 2024-05-13 RX ORDER — BUSPIRONE HYDROCHLORIDE 30 MG/1
30 TABLET ORAL 2 TIMES DAILY
Qty: 180 TABLET | Refills: 2 | OUTPATIENT
Start: 2024-05-13

## 2024-05-13 RX ORDER — BUPROPION HYDROCHLORIDE 150 MG/1
150 TABLET ORAL EVERY MORNING
Qty: 90 TABLET | Refills: 2 | OUTPATIENT
Start: 2024-05-13

## 2024-05-13 RX ORDER — TRAZODONE HYDROCHLORIDE 100 MG/1
100 TABLET ORAL NIGHTLY
Qty: 90 TABLET | Refills: 2 | OUTPATIENT
Start: 2024-05-13

## 2024-05-13 RX ORDER — FLUOXETINE HYDROCHLORIDE 60 MG/1
60 TABLET, FILM COATED ORAL; ORAL DAILY
Qty: 90 TABLET | Refills: 2 | OUTPATIENT
Start: 2024-05-13

## 2024-05-13 NOTE — TELEPHONE ENCOUNTER
Medication(s) requesting:   Requested Prescriptions     Pending Prescriptions Disp Refills    FLUoxetine HCl 60 MG TABS 90 tablet 2     Sig: Take 60 mg by mouth daily ceived the following from [facility]: Outside name: FLUoxetine 60 mg tab    buPROPion (WELLBUTRIN XL) 150 MG extended release tablet 90 tablet 2     Sig: Take 1 tablet by mouth every morning    busPIRone (BUSPAR) 30 MG tablet 180 tablet 2     Sig: Take 30 mg by mouth in the morning and at bedtime    Lisdexamfetamine Dimesylate 40 MG CAPS 30 capsule 0     Sig: Take 40 mg by mouth daily for 30 days. Max Daily Amount: 40 mg    traZODone (DESYREL) 100 MG tablet 90 tablet 2     Sig: Take 1 tablet by mouth nightly       Last office visit:  04/26/24  Next office visit DMA: 10/28/2024

## 2024-05-15 DIAGNOSIS — K64.4 EXTERNAL HEMORRHOID: ICD-10-CM

## 2024-05-15 DIAGNOSIS — F90.9 ATTENTION DEFICIT HYPERACTIVITY DISORDER (ADHD), UNSPECIFIED ADHD TYPE: ICD-10-CM

## 2024-05-15 DIAGNOSIS — K62.5 BRBPR (BRIGHT RED BLOOD PER RECTUM): ICD-10-CM

## 2024-05-15 RX ORDER — BUPROPION HYDROCHLORIDE 150 MG/1
150 TABLET ORAL EVERY MORNING
Qty: 90 TABLET | Refills: 2 | Status: SHIPPED | OUTPATIENT
Start: 2024-05-15

## 2024-05-15 RX ORDER — HYDROCORTISONE 25 MG/G
CREAM TOPICAL
Qty: 28 G | Refills: 2 | Status: SHIPPED | OUTPATIENT
Start: 2024-05-15

## 2024-05-15 RX ORDER — TRAZODONE HYDROCHLORIDE 100 MG/1
100 TABLET ORAL NIGHTLY
Qty: 90 TABLET | Refills: 2 | Status: SHIPPED | OUTPATIENT
Start: 2024-05-15

## 2024-05-15 RX ORDER — LISDEXAMFETAMINE DIMESYLATE 40 MG/1
40 CAPSULE ORAL DAILY
Qty: 30 CAPSULE | Refills: 0 | Status: SHIPPED | OUTPATIENT
Start: 2024-05-15 | End: 2024-06-14

## 2024-05-15 RX ORDER — BUSPIRONE HYDROCHLORIDE 30 MG/1
30 TABLET ORAL 2 TIMES DAILY
Qty: 180 TABLET | Refills: 2 | Status: SHIPPED | OUTPATIENT
Start: 2024-05-15

## 2024-05-15 RX ORDER — FLUOXETINE HYDROCHLORIDE 60 MG/1
60 TABLET, FILM COATED ORAL; ORAL DAILY
Qty: 90 TABLET | Refills: 2 | Status: SHIPPED | OUTPATIENT
Start: 2024-05-15

## 2024-06-24 DIAGNOSIS — F32.1 MAJOR DEPRESSIVE DISORDER, SINGLE EPISODE, MODERATE (HCC): Primary | ICD-10-CM

## 2024-09-05 ENCOUNTER — OFFICE VISIT (OUTPATIENT)
Facility: CLINIC | Age: 29
End: 2024-09-05
Payer: COMMERCIAL

## 2024-09-05 VITALS
SYSTOLIC BLOOD PRESSURE: 136 MMHG | HEART RATE: 109 BPM | BODY MASS INDEX: 51.91 KG/M2 | DIASTOLIC BLOOD PRESSURE: 86 MMHG | OXYGEN SATURATION: 97 % | WEIGHT: 293 LBS | RESPIRATION RATE: 16 BRPM | HEIGHT: 63 IN | TEMPERATURE: 97.7 F

## 2024-09-05 DIAGNOSIS — E11.65 TYPE 2 DIABETES MELLITUS WITH HYPERGLYCEMIA, WITH LONG-TERM CURRENT USE OF INSULIN (HCC): ICD-10-CM

## 2024-09-05 DIAGNOSIS — Z79.4 TYPE 2 DIABETES MELLITUS WITH HYPERGLYCEMIA, WITH LONG-TERM CURRENT USE OF INSULIN (HCC): ICD-10-CM

## 2024-09-05 DIAGNOSIS — R05.2 SUBACUTE COUGH: Primary | ICD-10-CM

## 2024-09-05 LAB — HBA1C MFR BLD: 7.8 %

## 2024-09-05 PROCEDURE — 99214 OFFICE O/P EST MOD 30 MIN: CPT | Performed by: STUDENT IN AN ORGANIZED HEALTH CARE EDUCATION/TRAINING PROGRAM

## 2024-09-05 PROCEDURE — 83036 HEMOGLOBIN GLYCOSYLATED A1C: CPT | Performed by: STUDENT IN AN ORGANIZED HEALTH CARE EDUCATION/TRAINING PROGRAM

## 2024-09-05 PROCEDURE — 3051F HG A1C>EQUAL 7.0%<8.0%: CPT | Performed by: STUDENT IN AN ORGANIZED HEALTH CARE EDUCATION/TRAINING PROGRAM

## 2024-09-05 RX ORDER — ALBUTEROL SULFATE 90 UG/1
2 AEROSOL, METERED RESPIRATORY (INHALATION) 4 TIMES DAILY PRN
Qty: 54 G | Refills: 1 | Status: SHIPPED | OUTPATIENT
Start: 2024-09-05

## 2024-09-05 RX ORDER — LEVOFLOXACIN 500 MG/1
500 TABLET, FILM COATED ORAL DAILY
Qty: 7 TABLET | Refills: 0 | Status: SHIPPED | OUTPATIENT
Start: 2024-09-05 | End: 2024-09-12

## 2024-09-05 SDOH — ECONOMIC STABILITY: FOOD INSECURITY: WITHIN THE PAST 12 MONTHS, YOU WORRIED THAT YOUR FOOD WOULD RUN OUT BEFORE YOU GOT MONEY TO BUY MORE.: NEVER TRUE

## 2024-09-05 SDOH — ECONOMIC STABILITY: FOOD INSECURITY: WITHIN THE PAST 12 MONTHS, THE FOOD YOU BOUGHT JUST DIDN'T LAST AND YOU DIDN'T HAVE MONEY TO GET MORE.: NEVER TRUE

## 2024-09-05 SDOH — ECONOMIC STABILITY: INCOME INSECURITY: HOW HARD IS IT FOR YOU TO PAY FOR THE VERY BASICS LIKE FOOD, HOUSING, MEDICAL CARE, AND HEATING?: NOT HARD AT ALL

## 2024-09-05 ASSESSMENT — PATIENT HEALTH QUESTIONNAIRE - PHQ9
3. TROUBLE FALLING OR STAYING ASLEEP: NOT AT ALL
10. IF YOU CHECKED OFF ANY PROBLEMS, HOW DIFFICULT HAVE THESE PROBLEMS MADE IT FOR YOU TO DO YOUR WORK, TAKE CARE OF THINGS AT HOME, OR GET ALONG WITH OTHER PEOPLE: NOT DIFFICULT AT ALL
SUM OF ALL RESPONSES TO PHQ QUESTIONS 1-9: 0
SUM OF ALL RESPONSES TO PHQ QUESTIONS 1-9: 0
2. FEELING DOWN, DEPRESSED OR HOPELESS: NOT AT ALL
4. FEELING TIRED OR HAVING LITTLE ENERGY: NOT AT ALL
SUM OF ALL RESPONSES TO PHQ QUESTIONS 1-9: 0
7. TROUBLE CONCENTRATING ON THINGS, SUCH AS READING THE NEWSPAPER OR WATCHING TELEVISION: NOT AT ALL
SUM OF ALL RESPONSES TO PHQ9 QUESTIONS 1 & 2: 0
6. FEELING BAD ABOUT YOURSELF - OR THAT YOU ARE A FAILURE OR HAVE LET YOURSELF OR YOUR FAMILY DOWN: NOT AT ALL
5. POOR APPETITE OR OVEREATING: NOT AT ALL
SUM OF ALL RESPONSES TO PHQ QUESTIONS 1-9: 0
8. MOVING OR SPEAKING SO SLOWLY THAT OTHER PEOPLE COULD HAVE NOTICED. OR THE OPPOSITE, BEING SO FIGETY OR RESTLESS THAT YOU HAVE BEEN MOVING AROUND A LOT MORE THAN USUAL: NOT AT ALL
1. LITTLE INTEREST OR PLEASURE IN DOING THINGS: NOT AT ALL
9. THOUGHTS THAT YOU WOULD BE BETTER OFF DEAD, OR OF HURTING YOURSELF: NOT AT ALL

## 2024-09-05 ASSESSMENT — ENCOUNTER SYMPTOMS
SHORTNESS OF BREATH: 1
RHINORRHEA: 1
COUGH: 1

## 2024-09-05 NOTE — PROGRESS NOTES
Humera Ferrera is a 29 y.o. year old female who presents today for   Chief Complaint   Patient presents with    Cough       Is someone accompanying this pt? no    Is the patient using any DME equipment during OV? no    Depression Screenin/5/2024     8:44 AM 2024     9:22 AM 1/10/2023     2:01 PM   PHQ-9 Questionaire   Little interest or pleasure in doing things 0 2 3   Feeling down, depressed, or hopeless 0 3 3   Trouble falling or staying asleep, or sleeping too much 0 3 3   Feeling tired or having little energy 0 3 3   Poor appetite or overeating 0 3 3   Feeling bad about yourself - or that you are a failure or have let yourself or your family down 0 3 3   Trouble concentrating on things, such as reading the newspaper or watching television 0 3 3   Moving or speaking so slowly that other people could have noticed. Or the opposite - being so fidgety or restless that you have been moving around a lot more than usual 0 0 0   Thoughts that you would be better off dead, or of hurting yourself in some way 0 3    PHQ-9 Total Score 0 23 21   If you checked off any problems, how difficult have these problems made it for you to do your work, take care of things at home, or get along with other people? 0 1        Abuse Screening:       No data to display                Learning Assessment:  No question data found.    Fall Risk:       No data to display                    Coordination of Care:   1. \"Have you been to the ER, urgent care clinic since your last visit?  Hospitalized since your last visit?\" no    2. \"Have you seen or consulted any other health care providers outside of the [facility] since your last visit?\" no    3. For patients aged 45-75: Has the patient had a colonoscopy / FIT/ Cologuard? no    If the patient is female:    4. For patients aged 40-74: Has the patient had a mammogram within the past 2 years? no    5. For patients aged 21-65: Has the patient had a pap smear?

## 2024-09-05 NOTE — PROGRESS NOTES
History of Present Illness  Humera Ferrera is a 29 y.o. female who presents today for management of    Chief Complaint   Patient presents with    Cough       -pt reports 3 weeks of persistent cough and chest tightness  -denies known fevers, but reports feeling warm to touch and shivering  -associated SOB and chest tightness intermittently ,however pt denies wheezing  -no sick contacts  -some congestion, coughing up white/yellow mucus/phlegm       Patient Active Problem List   Diagnosis    Current moderate episode of major depressive disorder without prior episode (HCC)    Dyslipidemia    Irregular periods    Type 2 diabetes mellitus with hyperglycemia, with long-term current use of insulin (HCC)    Morbid obesity due to excess calories (HCC)    Menorrhagia      Past Medical History:   Diagnosis Date    ADHD     Anxiety     Current moderate episode of major depressive disorder without prior episode ([facility]) 1/28/2021    Depression     Diabetes (HCC)     Hypercholesterolemia     Insomnia     Thyroid disease       No past surgical history on file.   Family History   Problem Relation Age of Onset    Diabetes Father     Diabetes Mother      Social History     Socioeconomic History    Marital status: Single     Spouse name: Not on file    Number of children: Not on file    Years of education: Not on file    Highest education level: Not on file   Occupational History    Not on file   Tobacco Use    Smoking status: Never    Smokeless tobacco: Never   Substance and Sexual Activity    Alcohol use: No    Drug use: Never    Sexual activity: Not on file   Other Topics Concern    Not on file   Social History Narrative    Not on file     Social Determinants of Health     Financial Resource Strain: Low Risk  (9/5/2024)    Overall Financial Resource Strain (CARDIA)     Difficulty of Paying Living Expenses: Not hard at all   Food Insecurity: No Food Insecurity (9/5/2024)    Hunger Vital Sign     Worried About Running Out of Food in the

## 2024-09-13 RX ORDER — TRAZODONE HYDROCHLORIDE 100 MG/1
100 TABLET ORAL NIGHTLY
Qty: 90 TABLET | Refills: 2 | Status: SHIPPED | OUTPATIENT
Start: 2024-09-13

## 2024-09-13 RX ORDER — BUPROPION HYDROCHLORIDE 150 MG/1
150 TABLET ORAL EVERY MORNING
Qty: 90 TABLET | Refills: 2 | Status: SHIPPED | OUTPATIENT
Start: 2024-09-13

## 2024-10-01 DIAGNOSIS — Z79.4 TYPE 2 DIABETES MELLITUS WITH HYPERGLYCEMIA, WITH LONG-TERM CURRENT USE OF INSULIN (HCC): ICD-10-CM

## 2024-10-01 DIAGNOSIS — E11.65 TYPE 2 DIABETES MELLITUS WITH HYPERGLYCEMIA, WITH LONG-TERM CURRENT USE OF INSULIN (HCC): ICD-10-CM

## 2024-10-01 RX ORDER — ROSUVASTATIN CALCIUM 20 MG/1
20 TABLET, COATED ORAL DAILY
Qty: 90 TABLET | Refills: 2 | Status: SHIPPED | OUTPATIENT
Start: 2024-10-01

## 2024-10-01 NOTE — TELEPHONE ENCOUNTER
Medication(s) requesting:   Requested Prescriptions     Pending Prescriptions Disp Refills    rosuvastatin (CRESTOR) 10 MG tablet [Pharmacy Med Name: ROSUVASTATIN CALCIUM 10 MG TAB] 30 tablet 2     Sig: TAKE 1 TABLET BY MOUTH EVERY DAY       Last office visit:  09/05/2024  Next office visit DMA: 10/25/2024

## 2024-10-07 RX ORDER — BUSPIRONE HYDROCHLORIDE 30 MG/1
30 TABLET ORAL 2 TIMES DAILY
Qty: 180 TABLET | Refills: 2 | Status: SHIPPED | OUTPATIENT
Start: 2024-10-07 | End: 2024-10-09 | Stop reason: SDUPTHER

## 2024-10-09 RX ORDER — BUSPIRONE HYDROCHLORIDE 30 MG/1
30 TABLET ORAL 2 TIMES DAILY
Qty: 180 TABLET | Refills: 2 | Status: SHIPPED | OUTPATIENT
Start: 2024-10-09

## 2024-10-25 ENCOUNTER — OFFICE VISIT (OUTPATIENT)
Facility: CLINIC | Age: 29
End: 2024-10-25
Payer: COMMERCIAL

## 2024-10-25 VITALS
BODY MASS INDEX: 51.91 KG/M2 | HEART RATE: 105 BPM | WEIGHT: 293 LBS | SYSTOLIC BLOOD PRESSURE: 137 MMHG | OXYGEN SATURATION: 97 % | HEIGHT: 63 IN | DIASTOLIC BLOOD PRESSURE: 86 MMHG | TEMPERATURE: 97.2 F | RESPIRATION RATE: 11 BRPM

## 2024-10-25 DIAGNOSIS — E11.65 TYPE 2 DIABETES MELLITUS WITH HYPERGLYCEMIA, WITH LONG-TERM CURRENT USE OF INSULIN (HCC): Primary | ICD-10-CM

## 2024-10-25 DIAGNOSIS — F90.9 ATTENTION DEFICIT HYPERACTIVITY DISORDER (ADHD), UNSPECIFIED ADHD TYPE: ICD-10-CM

## 2024-10-25 DIAGNOSIS — Z79.4 TYPE 2 DIABETES MELLITUS WITH HYPERGLYCEMIA, WITH LONG-TERM CURRENT USE OF INSULIN (HCC): Primary | ICD-10-CM

## 2024-10-25 DIAGNOSIS — F51.01 PRIMARY INSOMNIA: ICD-10-CM

## 2024-10-25 DIAGNOSIS — F32.1 MAJOR DEPRESSIVE DISORDER, SINGLE EPISODE, MODERATE (HCC): ICD-10-CM

## 2024-10-25 DIAGNOSIS — E66.01 CLASS 3 SEVERE OBESITY DUE TO EXCESS CALORIES WITHOUT SERIOUS COMORBIDITY WITH BODY MASS INDEX (BMI) OF 50.0 TO 59.9 IN ADULT: ICD-10-CM

## 2024-10-25 DIAGNOSIS — E03.9 ACQUIRED HYPOTHYROIDISM: ICD-10-CM

## 2024-10-25 DIAGNOSIS — E66.813 CLASS 3 SEVERE OBESITY DUE TO EXCESS CALORIES WITHOUT SERIOUS COMORBIDITY WITH BODY MASS INDEX (BMI) OF 50.0 TO 59.9 IN ADULT: ICD-10-CM

## 2024-10-25 DIAGNOSIS — F41.0 PANIC ATTACK: ICD-10-CM

## 2024-10-25 PROCEDURE — 3051F HG A1C>EQUAL 7.0%<8.0%: CPT | Performed by: STUDENT IN AN ORGANIZED HEALTH CARE EDUCATION/TRAINING PROGRAM

## 2024-10-25 PROCEDURE — 99214 OFFICE O/P EST MOD 30 MIN: CPT | Performed by: STUDENT IN AN ORGANIZED HEALTH CARE EDUCATION/TRAINING PROGRAM

## 2024-10-25 RX ORDER — DEXTROAMPHETAMINE SACCHARATE, AMPHETAMINE ASPARTATE MONOHYDRATE, DEXTROAMPHETAMINE SULFATE AND AMPHETAMINE SULFATE 1.25; 1.25; 1.25; 1.25 MG/1; MG/1; MG/1; MG/1
5 CAPSULE, EXTENDED RELEASE ORAL DAILY
Qty: 30 CAPSULE | Refills: 0 | Status: SHIPPED | OUTPATIENT
Start: 2024-11-24 | End: 2024-12-24

## 2024-10-25 RX ORDER — DEXTROAMPHETAMINE SACCHARATE, AMPHETAMINE ASPARTATE MONOHYDRATE, DEXTROAMPHETAMINE SULFATE AND AMPHETAMINE SULFATE 1.25; 1.25; 1.25; 1.25 MG/1; MG/1; MG/1; MG/1
5 CAPSULE, EXTENDED RELEASE ORAL DAILY
Qty: 30 CAPSULE | Refills: 0 | Status: SHIPPED | OUTPATIENT
Start: 2024-10-25 | End: 2024-11-24

## 2024-10-25 RX ORDER — DULAGLUTIDE 3 MG/.5ML
3 INJECTION, SOLUTION SUBCUTANEOUS WEEKLY
COMMUNITY

## 2024-10-25 RX ORDER — TRIAMCINOLONE ACETONIDE 1 MG/G
OINTMENT TOPICAL 2 TIMES DAILY
Qty: 30 G | Refills: 2 | Status: SHIPPED | OUTPATIENT
Start: 2024-10-25 | End: 2024-11-08

## 2024-10-25 RX ORDER — ALPRAZOLAM 0.25 MG/1
0.25 TABLET ORAL DAILY PRN
Qty: 12 TABLET | Refills: 0 | Status: SHIPPED | OUTPATIENT
Start: 2024-10-25 | End: 2024-11-24

## 2024-10-25 RX ORDER — DEXTROAMPHETAMINE SACCHARATE, AMPHETAMINE ASPARTATE MONOHYDRATE, DEXTROAMPHETAMINE SULFATE AND AMPHETAMINE SULFATE 1.25; 1.25; 1.25; 1.25 MG/1; MG/1; MG/1; MG/1
5 CAPSULE, EXTENDED RELEASE ORAL DAILY
Qty: 30 CAPSULE | Refills: 0 | Status: SHIPPED | OUTPATIENT
Start: 2024-12-24 | End: 2025-01-23

## 2024-10-25 SDOH — ECONOMIC STABILITY: INCOME INSECURITY: HOW HARD IS IT FOR YOU TO PAY FOR THE VERY BASICS LIKE FOOD, HOUSING, MEDICAL CARE, AND HEATING?: NOT HARD AT ALL

## 2024-10-25 SDOH — ECONOMIC STABILITY: FOOD INSECURITY: WITHIN THE PAST 12 MONTHS, THE FOOD YOU BOUGHT JUST DIDN'T LAST AND YOU DIDN'T HAVE MONEY TO GET MORE.: NEVER TRUE

## 2024-10-25 SDOH — ECONOMIC STABILITY: FOOD INSECURITY: WITHIN THE PAST 12 MONTHS, YOU WORRIED THAT YOUR FOOD WOULD RUN OUT BEFORE YOU GOT MONEY TO BUY MORE.: NEVER TRUE

## 2024-10-25 ASSESSMENT — PATIENT HEALTH QUESTIONNAIRE - PHQ9
SUM OF ALL RESPONSES TO PHQ QUESTIONS 1-9: 0
9. THOUGHTS THAT YOU WOULD BE BETTER OFF DEAD, OR OF HURTING YOURSELF: NOT AT ALL
3. TROUBLE FALLING OR STAYING ASLEEP: NOT AT ALL
2. FEELING DOWN, DEPRESSED OR HOPELESS: NOT AT ALL
SUM OF ALL RESPONSES TO PHQ QUESTIONS 1-9: 0
1. LITTLE INTEREST OR PLEASURE IN DOING THINGS: NOT AT ALL
4. FEELING TIRED OR HAVING LITTLE ENERGY: NOT AT ALL
7. TROUBLE CONCENTRATING ON THINGS, SUCH AS READING THE NEWSPAPER OR WATCHING TELEVISION: NOT AT ALL
8. MOVING OR SPEAKING SO SLOWLY THAT OTHER PEOPLE COULD HAVE NOTICED. OR THE OPPOSITE, BEING SO FIGETY OR RESTLESS THAT YOU HAVE BEEN MOVING AROUND A LOT MORE THAN USUAL: NOT AT ALL
SUM OF ALL RESPONSES TO PHQ QUESTIONS 1-9: 0
6. FEELING BAD ABOUT YOURSELF - OR THAT YOU ARE A FAILURE OR HAVE LET YOURSELF OR YOUR FAMILY DOWN: NOT AT ALL
SUM OF ALL RESPONSES TO PHQ9 QUESTIONS 1 & 2: 0
SUM OF ALL RESPONSES TO PHQ QUESTIONS 1-9: 0
5. POOR APPETITE OR OVEREATING: NOT AT ALL
10. IF YOU CHECKED OFF ANY PROBLEMS, HOW DIFFICULT HAVE THESE PROBLEMS MADE IT FOR YOU TO DO YOUR WORK, TAKE CARE OF THINGS AT HOME, OR GET ALONG WITH OTHER PEOPLE: NOT DIFFICULT AT ALL

## 2024-10-25 NOTE — PROGRESS NOTES
History of Present Illness  Humera Ferrera is a 29 y.o. female who presents today for management of    Chief Complaint   Patient presents with   • 6 Month Follow-Up   • Follow-up Chronic Condition         {HPI:44845}  {Chronic Disease HPI:14191}    Additional Concerns: ***       Patient Active Problem List   Diagnosis   • Current moderate episode of major depressive disorder without prior episode (HCC)   • Dyslipidemia   • Irregular periods   • Type 2 diabetes mellitus with hyperglycemia, with long-term current use of insulin (HCC)   • Morbid obesity due to excess calories   • Menorrhagia      Past Medical History:   Diagnosis Date   • ADHD    • Anxiety    • Current moderate episode of major depressive disorder without prior episode (HCC) 1/28/2021   • Depression    • Diabetes (HCC)    • Hypercholesterolemia    • Hypothyroidism    • Insomnia    • Obesity    • Thyroid disease    • Type 2 diabetes mellitus without complication (HCC)       History reviewed. No pertinent surgical history.   Family History   Problem Relation Age of Onset   • Diabetes Father    • Diabetes Mother    • Anemia Mother    • Anemia Sister      Social History     Socioeconomic History   • Marital status: Single     Spouse name: Not on file   • Number of children: Not on file   • Years of education: Not on file   • Highest education level: Not on file   Occupational History   • Not on file   Tobacco Use   • Smoking status: Never   • Smokeless tobacco: Never   Substance and Sexual Activity   • Alcohol use: No   • Drug use: Never   • Sexual activity: Never   Other Topics Concern   • Not on file   Social History Narrative   • Not on file     Social Determinants of Health     Financial Resource Strain: Low Risk  (10/25/2024)    Overall Financial Resource Strain (CARDIA)    • Difficulty of Paying Living Expenses: Not hard at all   Food Insecurity: No Food Insecurity (10/25/2024)    Hunger Vital Sign    • Worried About Running Out of Food in the Last 
Humera Ferrera is a 29 y.o. year old female who presents today for   Chief Complaint   Patient presents with    6 Month Follow-Up    Follow-up Chronic Condition        \"Have you been to the ER, urgent care clinic since your last visit?  Hospitalized since your last visit?\"   NO     “Have you seen or consulted any other health care providers outside our system since your last visit?”   NO       “Have you had a diabetic eye exam?”    YES - Where: [address] [place] [address]. [address] [place] [address] Nurse/CMA to request most recent records if not in the chart     No diabetic eye exam on file           - Ash Rowan/BRANDON Moser  [employer] license of [facility]  Phone: [phone]  Fax: [phone]  
  Neurological:  Negative for dizziness, light-headedness and headaches.   Psychiatric/Behavioral:  Negative for dysphoric mood.          Physical Exam  Vitals:    10/25/24 1009   BP: 137/86   Site: Right Upper Arm   Position: Sitting   Cuff Size: Large Adult   Pulse: (!) 105   Resp: 11   Temp: 97.2 °F (36.2 °C)   TempSrc: Temporal   SpO2: 97%   Weight: (!) 138.3 kg (304 lb 12.8 oz)   Height: 1.6 m (5' 3\")     General: alert, oriented, not in distress  Chest/Lungs: breathing comfortably on room air  Heart: normal rate  Extremities: no focal deformities, no edema  Skin: no active skin lesions      Assessment/Plan:    Humera was seen today for 6 month follow-up and follow-up chronic condition.    Diagnoses and all orders for this visit:    Type 2 diabetes mellitus with hyperglycemia, with long-term current use of insulin (HCC)  -Continue current management per endocrinology    Acquired hypothyroidism  -Continue Synthroid per endocrinology    Major depressive disorder, single episode, moderate   Attention deficit hyperactivity disorder (ADHD), unspecified ADHD type  -Continue Adderall at current dosing  -Referral to psychiatry for medication management  -     amphetamine-dextroamphetamine (ADDERALL XR) 5 MG extended release capsule; Take 1 capsule by mouth daily for 30 days. Max Daily Amount: 5 mg  -     amphetamine-dextroamphetamine (ADDERALL XR) 5 MG extended release capsule; Take 1 capsule by mouth daily for 30 days. Max Daily Amount: 5 mg  -     amphetamine-dextroamphetamine (ADDERALL XR) 5 MG extended release capsule; Take 1 capsule by mouth daily for 30 days. Max Daily Amount: 5 mg  -     External Referral To Psychiatry    Panic attack  -Short course of benzodiazepine for breakthrough anxiety, discussed importance of using sparingly  -     ALPRAZolam (XANAX) 0.25 MG tablet; Take 1 tablet by mouth daily as needed for Anxiety for up to 30 days. Max Daily Amount: 0.25 mg  -     External Referral To

## 2024-10-30 ENCOUNTER — TELEPHONE (OUTPATIENT)
Facility: CLINIC | Age: 29
End: 2024-10-30

## 2024-11-12 DIAGNOSIS — F90.9 ATTENTION DEFICIT HYPERACTIVITY DISORDER (ADHD), UNSPECIFIED ADHD TYPE: Primary | ICD-10-CM

## 2024-11-12 RX ORDER — DEXTROAMPHETAMINE SACCHARATE, AMPHETAMINE ASPARTATE, DEXTROAMPHETAMINE SULFATE AND AMPHETAMINE SULFATE 1.25; 1.25; 1.25; 1.25 MG/1; MG/1; MG/1; MG/1
5 TABLET ORAL DAILY
Qty: 30 TABLET | Refills: 0 | Status: SHIPPED | OUTPATIENT
Start: 2024-11-12 | End: 2024-12-12

## 2024-11-12 RX ORDER — DEXTROAMPHETAMINE SACCHARATE, AMPHETAMINE ASPARTATE, DEXTROAMPHETAMINE SULFATE AND AMPHETAMINE SULFATE 1.25; 1.25; 1.25; 1.25 MG/1; MG/1; MG/1; MG/1
5 TABLET ORAL DAILY
Qty: 30 TABLET | Refills: 0 | Status: SHIPPED | OUTPATIENT
Start: 2025-01-11 | End: 2025-02-10

## 2024-11-12 RX ORDER — DEXTROAMPHETAMINE SACCHARATE, AMPHETAMINE ASPARTATE, DEXTROAMPHETAMINE SULFATE AND AMPHETAMINE SULFATE 1.25; 1.25; 1.25; 1.25 MG/1; MG/1; MG/1; MG/1
5 TABLET ORAL DAILY
Qty: 30 TABLET | Refills: 0 | Status: SHIPPED | OUTPATIENT
Start: 2024-12-12 | End: 2025-01-11

## 2025-02-06 ASSESSMENT — SLEEP AND FATIGUE QUESTIONNAIRES
HOW LIKELY ARE YOU TO NOD OFF OR FALL ASLEEP WHILE SITTING QUIETLY AFTER LUNCH WITHOUT ALCOHOL: WOULD NEVER DOZE
HOW LIKELY ARE YOU TO NOD OFF OR FALL ASLEEP IN A CAR, WHILE STOPPED FOR A FEW MINUTES IN TRAFFIC: WOULD NEVER DOZE
ESS TOTAL SCORE: 3
HOW LIKELY ARE YOU TO NOD OFF OR FALL ASLEEP WHILE SITTING AND READING: WOULD NEVER DOZE
HOW LIKELY ARE YOU TO NOD OFF OR FALL ASLEEP WHILE WATCHING TV: WOULD NEVER DOZE
HOW LIKELY ARE YOU TO NOD OFF OR FALL ASLEEP WHILE SITTING QUIETLY AFTER LUNCH WITHOUT ALCOHOL: WOULD NEVER DOZE
HOW LIKELY ARE YOU TO NOD OFF OR FALL ASLEEP WHILE SITTING AND TALKING TO SOMEONE: WOULD NEVER DOZE
HOW LIKELY ARE YOU TO NOD OFF OR FALL ASLEEP WHILE LYING DOWN TO REST IN THE AFTERNOON WHEN CIRCUMSTANCES PERMIT: HIGH CHANCE OF DOZING
HOW LIKELY ARE YOU TO NOD OFF OR FALL ASLEEP WHILE LYING DOWN TO REST IN THE AFTERNOON WHEN CIRCUMSTANCES PERMIT: HIGH CHANCE OF DOZING
HOW LIKELY ARE YOU TO NOD OFF OR FALL ASLEEP WHILE SITTING INACTIVE IN A PUBLIC PLACE: WOULD NEVER DOZE
HOW LIKELY ARE YOU TO NOD OFF OR FALL ASLEEP WHILE SITTING AND TALKING TO SOMEONE: WOULD NEVER DOZE
HOW LIKELY ARE YOU TO NOD OFF OR FALL ASLEEP WHEN YOU ARE A PASSENGER IN A CAR FOR AN HOUR WITHOUT A BREAK: WOULD NEVER DOZE
HOW LIKELY ARE YOU TO NOD OFF OR FALL ASLEEP WHEN YOU ARE A PASSENGER IN A CAR FOR AN HOUR WITHOUT A BREAK: WOULD NEVER DOZE
HOW LIKELY ARE YOU TO NOD OFF OR FALL ASLEEP WHILE WATCHING TV: WOULD NEVER DOZE
HOW LIKELY ARE YOU TO NOD OFF OR FALL ASLEEP WHILE SITTING INACTIVE IN A PUBLIC PLACE: WOULD NEVER DOZE
HOW LIKELY ARE YOU TO NOD OFF OR FALL ASLEEP IN A CAR, WHILE STOPPED FOR A FEW MINUTES IN TRAFFIC: WOULD NEVER DOZE
HOW LIKELY ARE YOU TO NOD OFF OR FALL ASLEEP WHILE SITTING AND READING: WOULD NEVER DOZE

## 2025-02-06 NOTE — PATIENT INSTRUCTIONS
Please make a follow up appointment to discuss the results of your sleep study. If this is impossible for some reason, please send me a \"My Chart\" message so that I may get back with you in a timely manner.    The [facility] Lab is located in the [facility], adjacent to [facility]. The lab is on the second floor. The direct number to call for sleep study related questions is: [phone].    Please call our clinic back at [phone] or send a message on [employer] if you have any questions or concerns or if you are experiencing any of the following:     You have not received a follow up appointment within 30 days prior the recommended follow up time.    If you are not tolerating treatment plan and/or not able to obtain equipment or prescribed medication(s).  if you are experiencing any difficulties with the Durable Medical Equipment  (DME) Company you may be using or is assigned to you.  Two weeks have passed and you have not received an appointment for a scheduled procedure.  Two weeks have passed since you underwent a test and/or procedure and you have not received your results.     If you are using a CPAP/BIPAP, or Home Ventilator Device- Please note the following.  Currently, many DMEs are experiencing supply chain difficulties and orders for equipment may be back logged several weeks.     Your  Durable Medical Equipment (DME ) company is supposed to provide you with replacement filters, tubing and masks. You can either call your DME when you need new supplies or you can arrange for an automatic shipment schedule.    Your need to be seen by our office at lat minimum of every 12 months in order to renew the prescription for these supplies.   Please make note of who your DME company is and their phone number.   Please make sure that you clean your mask and hosing on a regular basis.  Your DME can provide you with additional information regarding proper care and cleaning of your

## 2025-02-07 ENCOUNTER — OFFICE VISIT (OUTPATIENT)
Age: 30
End: 2025-02-07

## 2025-02-07 VITALS
DIASTOLIC BLOOD PRESSURE: 81 MMHG | BODY MASS INDEX: 51.52 KG/M2 | WEIGHT: 290.8 LBS | SYSTOLIC BLOOD PRESSURE: 125 MMHG | HEART RATE: 97 BPM | OXYGEN SATURATION: 96 % | HEIGHT: 63 IN | RESPIRATION RATE: 18 BRPM | TEMPERATURE: 97.5 F

## 2025-02-07 DIAGNOSIS — G47.00 INSOMNIA W/ SLEEP APNEA: ICD-10-CM

## 2025-02-07 DIAGNOSIS — F32.1 CURRENT MODERATE EPISODE OF MAJOR DEPRESSIVE DISORDER WITHOUT PRIOR EPISODE (HCC): ICD-10-CM

## 2025-02-07 DIAGNOSIS — G47.30 INSOMNIA W/ SLEEP APNEA: ICD-10-CM

## 2025-02-07 DIAGNOSIS — Z72.821 INADEQUATE SLEEP HYGIENE: ICD-10-CM

## 2025-02-07 DIAGNOSIS — Z79.4 TYPE 2 DIABETES MELLITUS WITH HYPERGLYCEMIA, WITH LONG-TERM CURRENT USE OF INSULIN (HCC): ICD-10-CM

## 2025-02-07 DIAGNOSIS — F90.8 OTHER SPECIFIED ATTENTION DEFICIT HYPERACTIVITY DISORDER (ADHD): ICD-10-CM

## 2025-02-07 DIAGNOSIS — E11.65 TYPE 2 DIABETES MELLITUS WITH HYPERGLYCEMIA, WITH LONG-TERM CURRENT USE OF INSULIN (HCC): ICD-10-CM

## 2025-02-07 DIAGNOSIS — G47.63 BRUXISM, SLEEP-RELATED: ICD-10-CM

## 2025-02-07 DIAGNOSIS — G47.61 PERIODIC LIMB MOVEMENTS OF SLEEP: ICD-10-CM

## 2025-02-07 DIAGNOSIS — R06.83 SNORING: ICD-10-CM

## 2025-02-07 DIAGNOSIS — R29.818 SUSPECTED SLEEP APNEA: Primary | ICD-10-CM

## 2025-02-07 DIAGNOSIS — G25.81 RESTLESS LEGS SYNDROME (RLS): ICD-10-CM

## 2025-02-07 DIAGNOSIS — E66.01 MORBID OBESITY WITH BMI OF 50.0-59.9, ADULT: ICD-10-CM

## 2025-02-07 DIAGNOSIS — R06.02 WAKING AT NIGHT SHORT OF BREATH: ICD-10-CM

## 2025-02-07 PROBLEM — R07.9 RIGHT-SIDED CHEST PAIN: Status: ACTIVE | Noted: 2021-04-02

## 2025-02-07 PROBLEM — F98.8 ATTENTION DEFICIT DISORDER: Status: ACTIVE | Noted: 2022-08-25

## 2025-02-07 PROBLEM — F32.A DEPRESSION: Status: ACTIVE | Noted: 2022-08-25

## 2025-02-07 PROBLEM — E11.9 TYPE 2 DIABETES MELLITUS (HCC): Status: ACTIVE | Noted: 2022-08-25

## 2025-02-07 PROBLEM — D64.9 ANEMIA: Status: ACTIVE | Noted: 2022-08-25

## 2025-02-07 PROBLEM — F41.9 ANXIETY: Status: ACTIVE | Noted: 2022-08-25

## 2025-02-07 NOTE — ASSESSMENT & PLAN NOTE
Chronic, not at goal (unstable), continue current treatment plan, last A1C as 7.8 on 9/5/24. Prior value was 7.2. Takes Trulicity, Lantus, managed by PCP.   no

## 2025-02-07 NOTE — PROGRESS NOTES
Humera Ferrera presents today for   Chief Complaint   Patient presents with    New Patient     Sleep Apnea/ insomnia        Is someone accompanying this pt? No    Is the patient using any DME equipment during OV? No    -DME Company No    Depression Screening:        10/25/2024    10:06 AM   PHQ-9 Questionaire   Little interest or pleasure in doing things 0   Feeling down, depressed, or hopeless 0   Trouble falling or staying asleep, or sleeping too much 0   Feeling tired or having little energy 0   Poor appetite or overeating 0   Feeling bad about yourself - or that you are a failure or have let yourself or your family down 0   Trouble concentrating on things, such as reading the newspaper or watching television 0   Moving or speaking so slowly that other people could have noticed. Or the opposite - being so fidgety or restless that you have been moving around a lot more than usual 0   Thoughts that you would be better off dead, or of hurting yourself in some way 0   PHQ-9 Total Score 0   If you checked off any problems, how difficult have these problems made it for you to do your work, take care of things at home, or get along with other people? 0       Learning Assessment:    Failed to redirect to the Timeline version of the [place] [employer].    Abuse Screening:         No data to display                Fall Risk    Failed to redirect to the Timeline version of the [place] [employer].    Coordination of Care:    1. Have you been to the ER, urgent care clinic since your last visit? Hospitalized since your last visit? No    2. Have you seen or consulted any other health care providers outside of the [facility] since your last visit? Include any pap smears or colon screening. No    Medication list has been update per patient.    
       Lab Results   Component Value Date/Time     04/26/2024 10:34 AM    K 4.3 04/26/2024 10:34 AM     04/26/2024 10:34 AM    CO2 28 04/26/2024 10:34 AM    BUN 13 04/26/2024 10:34 AM    CREATININE 0.77 04/26/2024 10:34 AM    GLUCOSE 155 04/26/2024 10:34 AM    CALCIUM 9.2 04/26/2024 10:34 AM    LABGLOM >90 04/26/2024 10:34 AM         Lab Results   Component Value Date    FERRITIN 67 02/10/2022      Component  Ref Range & Units 9/5/24 0917   Hemoglobin A1C, POC  % 7.8     Historical Sleep Testing Data:   PSG 3/8/2018  Primary snoring  AHI 2.7   PSG was done but did not demonstrate Sleep disordered breathing. Sufficient REM sleep was not recorded however.  Significant periodic limb movements were noted with a periodic limb movement index of 19.2 with associated arousal index of 1.    This note was dictated utilizing voice recognition software which may lead to typographical errors.  I apologize in advance if the situation occurs.  If questions arise please do not hesitate to contact me or call our department.    Electronically signed by Sadie David DO on2/7/2025 at 3:05 PM

## 2025-02-07 NOTE — ASSESSMENT & PLAN NOTE
Unclear control, currently taking 5 mg of Adderall daily.  Stated today that she has not been able to find a psychiatrist yet because \"I am too tired\".

## 2025-02-07 NOTE — ASSESSMENT & PLAN NOTE
Possibly. If she carries this diagnosis then the periodic limb movements of sleep disorder diagnosis is irrelevant. Will need to follow-up in this when she returns     Certainly drinking a lot of caffeine and taking stimulants will be counterproductive if she has restless legs.  It also appears that she has a history of anemia which needs to be evaluated ongoing as well.  Certainly low iron would make restless legs a lot worse which we will need to discuss.

## 2025-02-22 SDOH — ECONOMIC STABILITY: FOOD INSECURITY: WITHIN THE PAST 12 MONTHS, YOU WORRIED THAT YOUR FOOD WOULD RUN OUT BEFORE YOU GOT MONEY TO BUY MORE.: NEVER TRUE

## 2025-02-22 SDOH — ECONOMIC STABILITY: FOOD INSECURITY: WITHIN THE PAST 12 MONTHS, THE FOOD YOU BOUGHT JUST DIDN'T LAST AND YOU DIDN'T HAVE MONEY TO GET MORE.: NEVER TRUE

## 2025-02-22 SDOH — ECONOMIC STABILITY: TRANSPORTATION INSECURITY
IN THE PAST 12 MONTHS, HAS LACK OF TRANSPORTATION KEPT YOU FROM MEETINGS, WORK, OR FROM GETTING THINGS NEEDED FOR DAILY LIVING?: YES

## 2025-02-22 SDOH — ECONOMIC STABILITY: INCOME INSECURITY: IN THE LAST 12 MONTHS, WAS THERE A TIME WHEN YOU WERE NOT ABLE TO PAY THE MORTGAGE OR RENT ON TIME?: PATIENT DECLINED

## 2025-02-22 SDOH — ECONOMIC STABILITY: TRANSPORTATION INSECURITY
IN THE PAST 12 MONTHS, HAS THE LACK OF TRANSPORTATION KEPT YOU FROM MEDICAL APPOINTMENTS OR FROM GETTING MEDICATIONS?: YES

## 2025-02-24 ENCOUNTER — OFFICE VISIT (OUTPATIENT)
Facility: CLINIC | Age: 30
End: 2025-02-24
Payer: COMMERCIAL

## 2025-02-24 VITALS
RESPIRATION RATE: 13 BRPM | SYSTOLIC BLOOD PRESSURE: 127 MMHG | WEIGHT: 290.2 LBS | TEMPERATURE: 98.1 F | HEART RATE: 107 BPM | OXYGEN SATURATION: 97 % | BODY MASS INDEX: 51.42 KG/M2 | DIASTOLIC BLOOD PRESSURE: 84 MMHG | HEIGHT: 63 IN

## 2025-02-24 DIAGNOSIS — F41.0 PANIC ATTACK: ICD-10-CM

## 2025-02-24 DIAGNOSIS — F90.9 ATTENTION DEFICIT HYPERACTIVITY DISORDER (ADHD), UNSPECIFIED ADHD TYPE: ICD-10-CM

## 2025-02-24 DIAGNOSIS — F32.1 MAJOR DEPRESSIVE DISORDER, SINGLE EPISODE, MODERATE (HCC): Primary | ICD-10-CM

## 2025-02-24 DIAGNOSIS — E11.65 TYPE 2 DIABETES MELLITUS WITH HYPERGLYCEMIA, WITH LONG-TERM CURRENT USE OF INSULIN (HCC): ICD-10-CM

## 2025-02-24 DIAGNOSIS — Z79.4 TYPE 2 DIABETES MELLITUS WITH HYPERGLYCEMIA, WITH LONG-TERM CURRENT USE OF INSULIN (HCC): ICD-10-CM

## 2025-02-24 PROCEDURE — 99214 OFFICE O/P EST MOD 30 MIN: CPT | Performed by: STUDENT IN AN ORGANIZED HEALTH CARE EDUCATION/TRAINING PROGRAM

## 2025-02-24 RX ORDER — FLUOXETINE HYDROCHLORIDE 60 MG/1
60 TABLET, FILM COATED ORAL; ORAL DAILY
Qty: 90 TABLET | Refills: 2 | Status: SHIPPED | OUTPATIENT
Start: 2025-02-24

## 2025-02-24 RX ORDER — ALPRAZOLAM 0.25 MG
0.25 TABLET ORAL DAILY PRN
Qty: 15 TABLET | Refills: 0 | Status: SHIPPED | OUTPATIENT
Start: 2025-02-24 | End: 2025-03-26

## 2025-02-24 RX ORDER — DEXTROAMPHETAMINE SACCHARATE, AMPHETAMINE ASPARTATE MONOHYDRATE, DEXTROAMPHETAMINE SULFATE AND AMPHETAMINE SULFATE 2.5; 2.5; 2.5; 2.5 MG/1; MG/1; MG/1; MG/1
10 CAPSULE, EXTENDED RELEASE ORAL DAILY
Qty: 30 CAPSULE | Refills: 0 | Status: SHIPPED | OUTPATIENT
Start: 2025-02-24 | End: 2025-03-26

## 2025-02-24 RX ORDER — BUPROPION HYDROCHLORIDE 300 MG/1
300 TABLET ORAL EVERY MORNING
Qty: 90 TABLET | Refills: 2 | Status: SHIPPED | OUTPATIENT
Start: 2025-02-24

## 2025-02-24 RX ORDER — BUSPIRONE HYDROCHLORIDE 30 MG/1
30 TABLET ORAL 2 TIMES DAILY
Qty: 180 TABLET | Refills: 2 | Status: SHIPPED | OUTPATIENT
Start: 2025-02-24

## 2025-02-24 SDOH — ECONOMIC STABILITY: FOOD INSECURITY: WITHIN THE PAST 12 MONTHS, THE FOOD YOU BOUGHT JUST DIDN'T LAST AND YOU DIDN'T HAVE MONEY TO GET MORE.: NEVER TRUE

## 2025-02-24 SDOH — ECONOMIC STABILITY: FOOD INSECURITY: WITHIN THE PAST 12 MONTHS, YOU WORRIED THAT YOUR FOOD WOULD RUN OUT BEFORE YOU GOT MONEY TO BUY MORE.: NEVER TRUE

## 2025-02-24 ASSESSMENT — PATIENT HEALTH QUESTIONNAIRE - PHQ9
10. IF YOU CHECKED OFF ANY PROBLEMS, HOW DIFFICULT HAVE THESE PROBLEMS MADE IT FOR YOU TO DO YOUR WORK, TAKE CARE OF THINGS AT HOME, OR GET ALONG WITH OTHER PEOPLE: NOT DIFFICULT AT ALL
SUM OF ALL RESPONSES TO PHQ QUESTIONS 1-9: 0
8. MOVING OR SPEAKING SO SLOWLY THAT OTHER PEOPLE COULD HAVE NOTICED. OR THE OPPOSITE, BEING SO FIGETY OR RESTLESS THAT YOU HAVE BEEN MOVING AROUND A LOT MORE THAN USUAL: NOT AT ALL
1. LITTLE INTEREST OR PLEASURE IN DOING THINGS: NOT AT ALL
7. TROUBLE CONCENTRATING ON THINGS, SUCH AS READING THE NEWSPAPER OR WATCHING TELEVISION: NOT AT ALL
5. POOR APPETITE OR OVEREATING: NOT AT ALL
SUM OF ALL RESPONSES TO PHQ QUESTIONS 1-9: 0
2. FEELING DOWN, DEPRESSED OR HOPELESS: NOT AT ALL
6. FEELING BAD ABOUT YOURSELF - OR THAT YOU ARE A FAILURE OR HAVE LET YOURSELF OR YOUR FAMILY DOWN: NOT AT ALL
SUM OF ALL RESPONSES TO PHQ9 QUESTIONS 1 & 2: 0
4. FEELING TIRED OR HAVING LITTLE ENERGY: NOT AT ALL
SUM OF ALL RESPONSES TO PHQ QUESTIONS 1-9: 0
SUM OF ALL RESPONSES TO PHQ QUESTIONS 1-9: 0
9. THOUGHTS THAT YOU WOULD BE BETTER OFF DEAD, OR OF HURTING YOURSELF: NOT AT ALL
3. TROUBLE FALLING OR STAYING ASLEEP: NOT AT ALL

## 2025-02-24 ASSESSMENT — ENCOUNTER SYMPTOMS
SHORTNESS OF BREATH: 0
ABDOMINAL PAIN: 0

## 2025-02-24 NOTE — PROGRESS NOTES
Humera Ferrera is a 30 y.o. year old female who presents today for   Chief Complaint   Patient presents with    ADHD    3 Month Follow-Up        \"Have you been to the ER, urgent care clinic since your last visit?  Hospitalized since your last visit?\"   NO     “Have you seen or consulted any other health care providers outside our system since your last visit?”   NO       “Have you had a diabetic eye exam?”    NO     No diabetic eye exam on file           - Ash Rowan/BRANDON Moser  [facility]  Phone: [phone]  Fax: [phone]

## 2025-02-24 NOTE — PROGRESS NOTES
History of Present Illness  Humera Ferrera is a 30 y.o. female who presents today for management of    Chief Complaint   Patient presents with    ADHD    3 Month Follow-Up         -pt reporting increased mood swings, anger, and depression, very tearful during visit describing her current state of mind. Passive SI as well, without plan. Pt stating the current political atmosphere has been triggering her, and she is scared.   -She has not yet established care with psychiatry, previously referred   -pt has found some improvement in concentration and anxiety since starting adderall      Patient Active Problem List   Diagnosis    Current moderate episode of major depressive disorder without prior episode (HCC)    Dyslipidemia    Irregular periods    Type 2 diabetes mellitus with hyperglycemia, with long-term current use of insulin (HCC)    Morbid obesity with BMI of 50.0-59.9, adult    Menorrhagia    Attention deficit disorder    Depression    Hypothyroidism    Right-sided chest pain    Type 2 diabetes mellitus (HCC)    Anemia    Anxiety    Restless legs syndrome (RLS)      Past Medical History:   Diagnosis Date    ADHD     Anxiety     Current moderate episode of major depressive disorder without prior episode (HCC) 1/28/2021    Depression     Diabetes (HCC)     Hypercholesterolemia     Hypothyroidism     Insomnia     Obesity     Thyroid disease     Type 2 diabetes mellitus without complication (HCC)       No past surgical history on file.   Family History   Problem Relation Age of Onset    Diabetes Mother     Anemia Mother     Sleep Apnea Mother     Diabetes Father     Anemia Sister      Social History     Socioeconomic History    Marital status: Single     Spouse name: Not on file    Number of children: Not on file    Years of education: Not on file    Highest education level: Not on file   Occupational History    Not on file   Tobacco Use    Smoking status: Never    Smokeless tobacco: Never   Substance and Sexual

## 2025-03-12 ENCOUNTER — HOSPITAL ENCOUNTER (OUTPATIENT)
Dept: SLEEP MEDICINE | Facility: HOSPITAL | Age: 30
Discharge: HOME | End: 2025-03-15
Attending: OTOLARYNGOLOGY
Payer: COMMERCIAL

## 2025-03-12 DIAGNOSIS — R29.818 SUSPECTED SLEEP APNEA: ICD-10-CM

## 2025-03-12 DIAGNOSIS — R06.02 WAKING AT NIGHT SHORT OF BREATH: ICD-10-CM

## 2025-03-12 DIAGNOSIS — R06.83 SNORING: ICD-10-CM

## 2025-03-12 PROCEDURE — 95800 SLP STDY UNATTENDED: CPT

## 2025-03-13 PROBLEM — R06.83 SNORING: Status: ACTIVE | Noted: 2025-03-13

## 2025-04-24 ENCOUNTER — OFFICE VISIT (OUTPATIENT)
Facility: CLINIC | Age: 30
End: 2025-04-24
Payer: COMMERCIAL

## 2025-04-24 VITALS
RESPIRATION RATE: 15 BRPM | TEMPERATURE: 98.2 F | HEART RATE: 106 BPM | OXYGEN SATURATION: 98 % | BODY MASS INDEX: 51.49 KG/M2 | SYSTOLIC BLOOD PRESSURE: 133 MMHG | WEIGHT: 290.6 LBS | HEIGHT: 63 IN | DIASTOLIC BLOOD PRESSURE: 88 MMHG

## 2025-04-24 DIAGNOSIS — Z79.4 TYPE 2 DIABETES MELLITUS WITH HYPERGLYCEMIA, WITH LONG-TERM CURRENT USE OF INSULIN (HCC): ICD-10-CM

## 2025-04-24 DIAGNOSIS — F90.9 ATTENTION DEFICIT HYPERACTIVITY DISORDER (ADHD), UNSPECIFIED ADHD TYPE: ICD-10-CM

## 2025-04-24 DIAGNOSIS — E11.65 TYPE 2 DIABETES MELLITUS WITH HYPERGLYCEMIA, WITH LONG-TERM CURRENT USE OF INSULIN (HCC): ICD-10-CM

## 2025-04-24 DIAGNOSIS — R00.0 TACHYCARDIA: ICD-10-CM

## 2025-04-24 DIAGNOSIS — F32.1 MAJOR DEPRESSIVE DISORDER, SINGLE EPISODE, MODERATE (HCC): Primary | ICD-10-CM

## 2025-04-24 DIAGNOSIS — F41.0 PANIC ATTACK: ICD-10-CM

## 2025-04-24 PROCEDURE — 99214 OFFICE O/P EST MOD 30 MIN: CPT | Performed by: STUDENT IN AN ORGANIZED HEALTH CARE EDUCATION/TRAINING PROGRAM

## 2025-04-24 SDOH — ECONOMIC STABILITY: FOOD INSECURITY: WITHIN THE PAST 12 MONTHS, YOU WORRIED THAT YOUR FOOD WOULD RUN OUT BEFORE YOU GOT MONEY TO BUY MORE.: NEVER TRUE

## 2025-04-24 SDOH — ECONOMIC STABILITY: FOOD INSECURITY: WITHIN THE PAST 12 MONTHS, THE FOOD YOU BOUGHT JUST DIDN'T LAST AND YOU DIDN'T HAVE MONEY TO GET MORE.: NEVER TRUE

## 2025-04-24 ASSESSMENT — PATIENT HEALTH QUESTIONNAIRE - PHQ9
4. FEELING TIRED OR HAVING LITTLE ENERGY: NOT AT ALL
7. TROUBLE CONCENTRATING ON THINGS, SUCH AS READING THE NEWSPAPER OR WATCHING TELEVISION: NOT AT ALL
SUM OF ALL RESPONSES TO PHQ QUESTIONS 1-9: 0
6. FEELING BAD ABOUT YOURSELF - OR THAT YOU ARE A FAILURE OR HAVE LET YOURSELF OR YOUR FAMILY DOWN: NOT AT ALL
10. IF YOU CHECKED OFF ANY PROBLEMS, HOW DIFFICULT HAVE THESE PROBLEMS MADE IT FOR YOU TO DO YOUR WORK, TAKE CARE OF THINGS AT HOME, OR GET ALONG WITH OTHER PEOPLE: NOT DIFFICULT AT ALL
9. THOUGHTS THAT YOU WOULD BE BETTER OFF DEAD, OR OF HURTING YOURSELF: NOT AT ALL
8. MOVING OR SPEAKING SO SLOWLY THAT OTHER PEOPLE COULD HAVE NOTICED. OR THE OPPOSITE, BEING SO FIGETY OR RESTLESS THAT YOU HAVE BEEN MOVING AROUND A LOT MORE THAN USUAL: NOT AT ALL
3. TROUBLE FALLING OR STAYING ASLEEP: NOT AT ALL
5. POOR APPETITE OR OVEREATING: NOT AT ALL
1. LITTLE INTEREST OR PLEASURE IN DOING THINGS: NOT AT ALL
SUM OF ALL RESPONSES TO PHQ QUESTIONS 1-9: 0
2. FEELING DOWN, DEPRESSED OR HOPELESS: NOT AT ALL

## 2025-04-24 NOTE — PROGRESS NOTES
History of Present Illness  Humera Ferrera is a 30 y.o. female who presents today for management of    Chief Complaint   Patient presents with    Follow-up         - Patient overall stable since last visit  -Her mental health has improved slightly since last visit, she is avoiding social media and politics to avoid triggers (fluoxetine continued, BuSpar continued, and Wellbutrin increased at last visit)  - Not currently seeing a psychiatrist or therapist, previous referral not completed as placed does not take her insurance  -She plans to call her old psychiatrist office and see if she can reestablish care  - Follows with endocrinology for management of type 2 diabetes  - Per patient recollection, her A1c from 2 days ago was 9.1  -She is awaiting Trulicity increase and starting continuous monitoring  - Adderall has been helpful in reducing her anxiety and improving her focusing/functioning      Patient Active Problem List   Diagnosis    Current moderate episode of major depressive disorder without prior episode (HCC)    Dyslipidemia    Irregular periods    Type 2 diabetes mellitus with hyperglycemia, with long-term current use of insulin (HCC)    Morbid obesity with BMI of 50.0-59.9, adult (HCC)    Menorrhagia    Attention deficit disorder    Depression    Hypothyroidism    Right-sided chest pain    Type 2 diabetes mellitus (HCC)    Anemia    Anxiety    Restless legs syndrome (RLS)    Snoring      Past Medical History:   Diagnosis Date    ADHD     Anxiety     Current moderate episode of major depressive disorder without prior episode (HCC) 1/28/2021    Depression     Diabetes (HCC)     Hypercholesterolemia     Hypothyroidism     Insomnia     Obesity     Thyroid disease     Type 2 diabetes mellitus without complication (HCC)       No past surgical history on file.   Family History   Problem Relation Age of Onset    Diabetes Mother     Anemia Mother     Sleep Apnea Mother     Diabetes Father     Anemia Sister

## 2025-04-24 NOTE — PROGRESS NOTES
Humera Ferrera is a 30 y.o. year old female who presents today for   Chief Complaint   Patient presents with    Follow-up        \"Have you been to the ER, urgent care clinic since your last visit?  Hospitalized since your last visit?\"   NO     “Have you seen or consulted any other health care providers outside our system since your last visit?”   NO       “Have you had a diabetic eye exam?”    NO     No diabetic eye exam on file           - Ash Rowan/BRANDON Moser  [facility]  Phone: [phone]  Fax: [phone]

## 2025-04-25 RX ORDER — DEXTROAMPHETAMINE SACCHARATE, AMPHETAMINE ASPARTATE MONOHYDRATE, DEXTROAMPHETAMINE SULFATE AND AMPHETAMINE SULFATE 2.5; 2.5; 2.5; 2.5 MG/1; MG/1; MG/1; MG/1
10 CAPSULE, EXTENDED RELEASE ORAL DAILY
Qty: 30 CAPSULE | Refills: 0 | Status: SHIPPED | OUTPATIENT
Start: 2025-04-25 | End: 2025-05-25

## 2025-04-25 RX ORDER — DEXTROAMPHETAMINE SACCHARATE, AMPHETAMINE ASPARTATE MONOHYDRATE, DEXTROAMPHETAMINE SULFATE AND AMPHETAMINE SULFATE 2.5; 2.5; 2.5; 2.5 MG/1; MG/1; MG/1; MG/1
10 CAPSULE, EXTENDED RELEASE ORAL DAILY
Qty: 30 CAPSULE | Refills: 0 | Status: SHIPPED | OUTPATIENT
Start: 2025-05-25 | End: 2025-06-24

## 2025-04-25 RX ORDER — DEXTROAMPHETAMINE SACCHARATE, AMPHETAMINE ASPARTATE MONOHYDRATE, DEXTROAMPHETAMINE SULFATE AND AMPHETAMINE SULFATE 2.5; 2.5; 2.5; 2.5 MG/1; MG/1; MG/1; MG/1
10 CAPSULE, EXTENDED RELEASE ORAL DAILY
Qty: 30 CAPSULE | Refills: 0 | Status: SHIPPED | OUTPATIENT
Start: 2025-06-24 | End: 2025-07-24

## 2025-04-25 ASSESSMENT — ENCOUNTER SYMPTOMS
SHORTNESS OF BREATH: 0
ABDOMINAL PAIN: 0

## 2025-06-26 RX ORDER — TRAZODONE HYDROCHLORIDE 100 MG/1
100 TABLET ORAL NIGHTLY
Qty: 90 TABLET | Refills: 2 | Status: SHIPPED | OUTPATIENT
Start: 2025-06-26

## 2025-06-26 NOTE — TELEPHONE ENCOUNTER
Medication(s) requesting:   Requested Prescriptions     Pending Prescriptions Disp Refills    traZODone (DESYREL) 100 MG tablet [Pharmacy Med Name: TRAZODONE 100 MG TABLET] 90 tablet 2     Sig: TAKE 1 TABLET BY MOUTH EVERY DAY AT NIGHT       Last office visit:  04/24/25  Next office visit DMA: 7/24/2025

## 2025-06-30 DIAGNOSIS — E66.813 CLASS 3 SEVERE OBESITY DUE TO EXCESS CALORIES WITH SERIOUS COMORBIDITY AND BODY MASS INDEX (BMI) OF 50.0 TO 59.9 IN ADULT (HCC): ICD-10-CM

## 2025-06-30 DIAGNOSIS — Z79.4 TYPE 2 DIABETES MELLITUS WITH HYPERGLYCEMIA, WITH LONG-TERM CURRENT USE OF INSULIN (HCC): Primary | ICD-10-CM

## 2025-06-30 DIAGNOSIS — E11.65 TYPE 2 DIABETES MELLITUS WITH HYPERGLYCEMIA, WITH LONG-TERM CURRENT USE OF INSULIN (HCC): Primary | ICD-10-CM

## 2025-07-07 ENCOUNTER — TELEMEDICINE (OUTPATIENT)
Facility: CLINIC | Age: 30
End: 2025-07-07
Payer: MEDICAID

## 2025-07-07 DIAGNOSIS — Z79.4 TYPE 2 DIABETES MELLITUS WITH HYPERGLYCEMIA, WITH LONG-TERM CURRENT USE OF INSULIN (HCC): ICD-10-CM

## 2025-07-07 DIAGNOSIS — F41.0 PANIC ATTACK: ICD-10-CM

## 2025-07-07 DIAGNOSIS — R11.0 NAUSEA: Primary | ICD-10-CM

## 2025-07-07 DIAGNOSIS — F32.1 MAJOR DEPRESSIVE DISORDER, SINGLE EPISODE, MODERATE (HCC): ICD-10-CM

## 2025-07-07 DIAGNOSIS — R12 HEARTBURN: ICD-10-CM

## 2025-07-07 DIAGNOSIS — E11.65 TYPE 2 DIABETES MELLITUS WITH HYPERGLYCEMIA, WITH LONG-TERM CURRENT USE OF INSULIN (HCC): ICD-10-CM

## 2025-07-07 DIAGNOSIS — F90.9 ATTENTION DEFICIT HYPERACTIVITY DISORDER (ADHD), UNSPECIFIED ADHD TYPE: ICD-10-CM

## 2025-07-07 PROCEDURE — 99214 OFFICE O/P EST MOD 30 MIN: CPT | Performed by: STUDENT IN AN ORGANIZED HEALTH CARE EDUCATION/TRAINING PROGRAM

## 2025-07-07 RX ORDER — ROSUVASTATIN CALCIUM 20 MG/1
20 TABLET, COATED ORAL DAILY
Qty: 90 TABLET | Refills: 2 | Status: SHIPPED | OUTPATIENT
Start: 2025-07-07

## 2025-07-07 RX ORDER — OMEPRAZOLE 40 MG/1
40 CAPSULE, DELAYED RELEASE ORAL
Qty: 90 CAPSULE | Refills: 1 | Status: SHIPPED | OUTPATIENT
Start: 2025-07-07

## 2025-07-07 RX ORDER — ONDANSETRON 4 MG/1
4 TABLET, ORALLY DISINTEGRATING ORAL 3 TIMES DAILY PRN
Qty: 21 TABLET | Refills: 0 | Status: SHIPPED | OUTPATIENT
Start: 2025-07-07

## 2025-07-07 ASSESSMENT — ENCOUNTER SYMPTOMS
VOMITING: 0
ABDOMINAL PAIN: 0
CONSTIPATION: 0
DIARRHEA: 0
NAUSEA: 1
SHORTNESS OF BREATH: 0

## 2025-07-07 NOTE — PROGRESS NOTES
Home: [id number] [id number]  The provider was located at: Facility (Appt Dept): [address], [address]  [place] [address]

## 2025-07-16 LAB — HBA1C MFR BLD HPLC: 6.2 %

## 2025-07-24 ENCOUNTER — TELEPHONE (OUTPATIENT)
Facility: CLINIC | Age: 30
End: 2025-07-24

## 2025-07-24 ENCOUNTER — TELEMEDICINE (OUTPATIENT)
Facility: CLINIC | Age: 30
End: 2025-07-24
Payer: MEDICAID

## 2025-07-24 DIAGNOSIS — F32.1 MAJOR DEPRESSIVE DISORDER, SINGLE EPISODE, MODERATE (HCC): ICD-10-CM

## 2025-07-24 DIAGNOSIS — F90.9 ATTENTION DEFICIT HYPERACTIVITY DISORDER (ADHD), UNSPECIFIED ADHD TYPE: ICD-10-CM

## 2025-07-24 DIAGNOSIS — F41.0 PANIC ATTACK: ICD-10-CM

## 2025-07-24 DIAGNOSIS — K21.9 GASTROESOPHAGEAL REFLUX DISEASE WITHOUT ESOPHAGITIS: Primary | ICD-10-CM

## 2025-07-24 PROCEDURE — 99214 OFFICE O/P EST MOD 30 MIN: CPT | Performed by: STUDENT IN AN ORGANIZED HEALTH CARE EDUCATION/TRAINING PROGRAM

## 2025-07-24 RX ORDER — ALPRAZOLAM 0.25 MG
0.25 TABLET ORAL DAILY PRN
Qty: 30 TABLET | Refills: 0 | Status: SHIPPED | OUTPATIENT
Start: 2025-07-24 | End: 2025-10-22

## 2025-07-24 SDOH — ECONOMIC STABILITY: FOOD INSECURITY: WITHIN THE PAST 12 MONTHS, THE FOOD YOU BOUGHT JUST DIDN'T LAST AND YOU DIDN'T HAVE MONEY TO GET MORE.: NEVER TRUE

## 2025-07-24 SDOH — ECONOMIC STABILITY: FOOD INSECURITY: WITHIN THE PAST 12 MONTHS, YOU WORRIED THAT YOUR FOOD WOULD RUN OUT BEFORE YOU GOT MONEY TO BUY MORE.: NEVER TRUE

## 2025-07-24 ASSESSMENT — PATIENT HEALTH QUESTIONNAIRE - PHQ9
8. MOVING OR SPEAKING SO SLOWLY THAT OTHER PEOPLE COULD HAVE NOTICED. OR THE OPPOSITE, BEING SO FIGETY OR RESTLESS THAT YOU HAVE BEEN MOVING AROUND A LOT MORE THAN USUAL: SEVERAL DAYS
6. FEELING BAD ABOUT YOURSELF - OR THAT YOU ARE A FAILURE OR HAVE LET YOURSELF OR YOUR FAMILY DOWN: SEVERAL DAYS
10. IF YOU CHECKED OFF ANY PROBLEMS, HOW DIFFICULT HAVE THESE PROBLEMS MADE IT FOR YOU TO DO YOUR WORK, TAKE CARE OF THINGS AT HOME, OR GET ALONG WITH OTHER PEOPLE: VERY DIFFICULT
SUM OF ALL RESPONSES TO PHQ QUESTIONS 1-9: 8
SUM OF ALL RESPONSES TO PHQ QUESTIONS 1-9: 8
9. THOUGHTS THAT YOU WOULD BE BETTER OFF DEAD, OR OF HURTING YOURSELF: NOT AT ALL
5. POOR APPETITE OR OVEREATING: SEVERAL DAYS
SUM OF ALL RESPONSES TO PHQ QUESTIONS 1-9: 8
4. FEELING TIRED OR HAVING LITTLE ENERGY: MORE THAN HALF THE DAYS
SUM OF ALL RESPONSES TO PHQ QUESTIONS 1-9: 8
2. FEELING DOWN, DEPRESSED OR HOPELESS: SEVERAL DAYS
7. TROUBLE CONCENTRATING ON THINGS, SUCH AS READING THE NEWSPAPER OR WATCHING TELEVISION: SEVERAL DAYS
3. TROUBLE FALLING OR STAYING ASLEEP: SEVERAL DAYS
1. LITTLE INTEREST OR PLEASURE IN DOING THINGS: NOT AT ALL

## 2025-07-24 ASSESSMENT — ENCOUNTER SYMPTOMS
SHORTNESS OF BREATH: 0
ABDOMINAL PAIN: 0

## 2025-07-24 NOTE — PROGRESS NOTES
History of Present Illness  Humera Ferrera is a 30 y.o. female who presents today for management of    Chief Complaint   Patient presents with   • Follow-up         -Patient overall stable since last visit  -Reports improvement in heartburn/reflux since starting omeprazole  -Nausea has dissipated as well, she has not felt the need to use Zofran  -Follow with endocrinology for management of her diabetes, reporting recent A1c of 6.2  -Mood stable overall, patient states she still has anxiety and panic attacks, mostly associated with current events and current political climate.      Patient Active Problem List   Diagnosis   • Current moderate episode of major depressive disorder without prior episode ([facility])   • Dyslipidemia   • Irregular periods   • Type 2 diabetes mellitus with hyperglycemia, with long-term current use of insulin ([facility])   • Morbid obesity with BMI of 50.0-59.9, adult ([facility])   • Menorrhagia   • Attention deficit disorder   • Depression   • Hypothyroidism   • Right-sided chest pain   • Type 2 diabetes mellitus ([facility])   • Anemia   • Anxiety   • Restless legs syndrome (RLS)   • Snoring      Past Medical History:   Diagnosis Date   • ADHD    • Anemia    • Anxiety    • Current moderate episode of major depressive disorder without prior episode ([facility]) 1/28/2021   • Depression    • Diabetes ([facility])    • Hypercholesterolemia    • Hypertension    • Hypothyroidism    • Insomnia    • Obesity    • Thyroid disease    • Type 2 diabetes mellitus without complication ([facility])       No past surgical history on file.   Family History   Problem Relation Age of Onset   • Diabetes Mother    • Anemia Mother    • Sleep Apnea Mother    • Diabetes Father    • Anemia Sister      Social History     Socioeconomic History   • Marital status: Single     Spouse name: Not on file   • Number of children: Not on file   • Years of education: Not on file   • Highest education level: Not on file   Occupational History   • Not on file   Tobacco Use

## 2025-07-24 NOTE — PROGRESS NOTES
Humera Ferrera is a 30 y.o. year old female who presents today for No chief complaint on file.       \"Have you been to the ER, urgent care clinic since your last visit?  Hospitalized since your last visit?\"   NO     “Have you seen or consulted any other health care providers outside our system since your last visit?”   NO       “Have you had a diabetic eye exam?”    NO     No diabetic eye exam on file           - Ash Rowan/BRANDON Moser  [facility]  Phone: [phone]  Fax: [phone]